# Patient Record
Sex: FEMALE | Race: WHITE | NOT HISPANIC OR LATINO | URBAN - METROPOLITAN AREA
[De-identification: names, ages, dates, MRNs, and addresses within clinical notes are randomized per-mention and may not be internally consistent; named-entity substitution may affect disease eponyms.]

---

## 2017-01-03 ENCOUNTER — AMBULATORY - HEALTHEAST (OUTPATIENT)
Dept: LAB | Facility: CLINIC | Age: 22
End: 2017-01-03

## 2017-01-03 DIAGNOSIS — R82.994 HYPERCALCIURIA: ICD-10-CM

## 2017-01-03 DIAGNOSIS — R82.992 HYPEROXALURIA: ICD-10-CM

## 2017-01-12 ENCOUNTER — OFFICE VISIT - HEALTHEAST (OUTPATIENT)
Dept: UROLOGY | Facility: CLINIC | Age: 22
End: 2017-01-12

## 2017-01-12 DIAGNOSIS — N20.9 URINARY CALCULUS, UNSPECIFIED: ICD-10-CM

## 2017-01-12 DIAGNOSIS — R82.994 HYPERCALCIURIA: ICD-10-CM

## 2017-03-03 ENCOUNTER — COMMUNICATION - HEALTHEAST (OUTPATIENT)
Dept: FAMILY MEDICINE | Facility: CLINIC | Age: 22
End: 2017-03-03

## 2018-04-24 ENCOUNTER — RECORDS - HEALTHEAST (OUTPATIENT)
Dept: ADMINISTRATIVE | Facility: OTHER | Age: 23
End: 2018-04-24

## 2018-09-14 ENCOUNTER — RECORDS - HEALTHEAST (OUTPATIENT)
Dept: ADMINISTRATIVE | Facility: OTHER | Age: 23
End: 2018-09-14

## 2019-05-09 ENCOUNTER — RECORDS - HEALTHEAST (OUTPATIENT)
Dept: ADMINISTRATIVE | Facility: OTHER | Age: 24
End: 2019-05-09

## 2019-06-26 ENCOUNTER — RECORDS - HEALTHEAST (OUTPATIENT)
Dept: ADMINISTRATIVE | Facility: OTHER | Age: 24
End: 2019-06-26

## 2019-07-15 ENCOUNTER — RECORDS - HEALTHEAST (OUTPATIENT)
Dept: ADMINISTRATIVE | Facility: OTHER | Age: 24
End: 2019-07-15

## 2019-09-08 ENCOUNTER — COMMUNICATION - HEALTHEAST (OUTPATIENT)
Dept: FAMILY MEDICINE | Facility: CLINIC | Age: 24
End: 2019-09-08

## 2019-10-02 ENCOUNTER — OFFICE VISIT - HEALTHEAST (OUTPATIENT)
Dept: FAMILY MEDICINE | Facility: CLINIC | Age: 24
End: 2019-10-02

## 2019-10-02 DIAGNOSIS — F90.0 ADHD (ATTENTION DEFICIT HYPERACTIVITY DISORDER), INATTENTIVE TYPE: ICD-10-CM

## 2019-10-02 DIAGNOSIS — F41.1 GENERALIZED ANXIETY DISORDER: ICD-10-CM

## 2019-10-02 DIAGNOSIS — Z00.00 ROUTINE GENERAL MEDICAL EXAMINATION AT A HEALTH CARE FACILITY: ICD-10-CM

## 2019-10-02 ASSESSMENT — PATIENT HEALTH QUESTIONNAIRE - PHQ9: SUM OF ALL RESPONSES TO PHQ QUESTIONS 1-9: 0

## 2019-10-02 ASSESSMENT — MIFFLIN-ST. JEOR: SCORE: 1249.13

## 2019-10-09 ENCOUNTER — OFFICE VISIT - HEALTHEAST (OUTPATIENT)
Dept: BEHAVIORAL HEALTH | Facility: CLINIC | Age: 24
End: 2019-10-09

## 2019-10-09 DIAGNOSIS — F90.0 ADHD (ATTENTION DEFICIT HYPERACTIVITY DISORDER), INATTENTIVE TYPE: ICD-10-CM

## 2019-10-09 DIAGNOSIS — F41.1 GENERALIZED ANXIETY DISORDER: ICD-10-CM

## 2019-10-09 LAB
AMPHETAMINES UR QL SCN: NORMAL
BARBITURATES UR QL: NORMAL
BENZODIAZ UR QL: NORMAL
CANNABINOIDS UR QL SCN: NORMAL
COCAINE UR QL: NORMAL
CREAT UR-MCNC: 176.3 MG/DL
METHADONE UR QL SCN: NORMAL
OPIATES UR QL SCN: NORMAL
OXYCODONE UR QL: NORMAL
PCP UR QL SCN: NORMAL

## 2019-10-09 ASSESSMENT — ANXIETY QUESTIONNAIRES
7. FEELING AFRAID AS IF SOMETHING AWFUL MIGHT HAPPEN: NOT AT ALL
1. FEELING NERVOUS, ANXIOUS, OR ON EDGE: SEVERAL DAYS
GAD7 TOTAL SCORE: 6
4. TROUBLE RELAXING: SEVERAL DAYS
3. WORRYING TOO MUCH ABOUT DIFFERENT THINGS: SEVERAL DAYS
5. BEING SO RESTLESS THAT IT IS HARD TO SIT STILL: SEVERAL DAYS
6. BECOMING EASILY ANNOYED OR IRRITABLE: SEVERAL DAYS
2. NOT BEING ABLE TO STOP OR CONTROL WORRYING: SEVERAL DAYS

## 2019-10-09 ASSESSMENT — MIFFLIN-ST. JEOR: SCORE: 1253.67

## 2019-10-09 ASSESSMENT — PATIENT HEALTH QUESTIONNAIRE - PHQ9: SUM OF ALL RESPONSES TO PHQ QUESTIONS 1-9: 2

## 2019-10-10 ENCOUNTER — COMMUNICATION - HEALTHEAST (OUTPATIENT)
Dept: BEHAVIORAL HEALTH | Facility: CLINIC | Age: 24
End: 2019-10-10

## 2019-10-14 ENCOUNTER — COMMUNICATION - HEALTHEAST (OUTPATIENT)
Dept: BEHAVIORAL HEALTH | Facility: CLINIC | Age: 24
End: 2019-10-14

## 2019-10-15 ENCOUNTER — COMMUNICATION - HEALTHEAST (OUTPATIENT)
Dept: BEHAVIORAL HEALTH | Facility: CLINIC | Age: 24
End: 2019-10-15

## 2019-10-23 ENCOUNTER — OFFICE VISIT - HEALTHEAST (OUTPATIENT)
Dept: BEHAVIORAL HEALTH | Facility: CLINIC | Age: 24
End: 2019-10-23

## 2019-10-23 DIAGNOSIS — F41.1 GENERALIZED ANXIETY DISORDER: ICD-10-CM

## 2019-10-23 ASSESSMENT — MIFFLIN-ST. JEOR: SCORE: 1253.67

## 2019-11-04 ENCOUNTER — COMMUNICATION - HEALTHEAST (OUTPATIENT)
Dept: BEHAVIORAL HEALTH | Facility: CLINIC | Age: 24
End: 2019-11-04

## 2019-11-04 DIAGNOSIS — F90.0 ADHD (ATTENTION DEFICIT HYPERACTIVITY DISORDER), INATTENTIVE TYPE: ICD-10-CM

## 2019-11-13 ENCOUNTER — COMMUNICATION - HEALTHEAST (OUTPATIENT)
Dept: BEHAVIORAL HEALTH | Facility: CLINIC | Age: 24
End: 2019-11-13

## 2019-11-13 DIAGNOSIS — F90.0 ADHD (ATTENTION DEFICIT HYPERACTIVITY DISORDER), INATTENTIVE TYPE: ICD-10-CM

## 2019-11-20 ENCOUNTER — OFFICE VISIT - HEALTHEAST (OUTPATIENT)
Dept: BEHAVIORAL HEALTH | Facility: CLINIC | Age: 24
End: 2019-11-20

## 2019-11-20 DIAGNOSIS — F41.1 GENERALIZED ANXIETY DISORDER: ICD-10-CM

## 2019-11-20 DIAGNOSIS — F90.0 ADHD (ATTENTION DEFICIT HYPERACTIVITY DISORDER), INATTENTIVE TYPE: ICD-10-CM

## 2019-11-20 RX ORDER — TRETINOIN 1 MG/G
CREAM TOPICAL
Status: SHIPPED | COMMUNITY
Start: 2018-01-09 | End: 2024-02-05

## 2019-11-20 ASSESSMENT — MIFFLIN-ST. JEOR: SCORE: 1244.6

## 2020-04-02 ENCOUNTER — COMMUNICATION - HEALTHEAST (OUTPATIENT)
Dept: BEHAVIORAL HEALTH | Facility: CLINIC | Age: 25
End: 2020-04-02

## 2020-04-02 DIAGNOSIS — F41.1 GENERALIZED ANXIETY DISORDER: ICD-10-CM

## 2020-04-07 ENCOUNTER — OFFICE VISIT - HEALTHEAST (OUTPATIENT)
Dept: FAMILY MEDICINE | Facility: CLINIC | Age: 25
End: 2020-04-07

## 2020-04-07 ENCOUNTER — COMMUNICATION - HEALTHEAST (OUTPATIENT)
Dept: BEHAVIORAL HEALTH | Facility: CLINIC | Age: 25
End: 2020-04-07

## 2020-04-07 DIAGNOSIS — F41.1 GENERALIZED ANXIETY DISORDER: ICD-10-CM

## 2020-04-07 ASSESSMENT — PATIENT HEALTH QUESTIONNAIRE - PHQ9: SUM OF ALL RESPONSES TO PHQ QUESTIONS 1-9: 3

## 2020-04-07 ASSESSMENT — ANXIETY QUESTIONNAIRES
7. FEELING AFRAID AS IF SOMETHING AWFUL MIGHT HAPPEN: NOT AT ALL
4. TROUBLE RELAXING: NOT AT ALL
IF YOU CHECKED OFF ANY PROBLEMS ON THIS QUESTIONNAIRE, HOW DIFFICULT HAVE THESE PROBLEMS MADE IT FOR YOU TO DO YOUR WORK, TAKE CARE OF THINGS AT HOME, OR GET ALONG WITH OTHER PEOPLE: SOMEWHAT DIFFICULT
3. WORRYING TOO MUCH ABOUT DIFFERENT THINGS: MORE THAN HALF THE DAYS
GAD7 TOTAL SCORE: 5
5. BEING SO RESTLESS THAT IT IS HARD TO SIT STILL: SEVERAL DAYS
2. NOT BEING ABLE TO STOP OR CONTROL WORRYING: SEVERAL DAYS
6. BECOMING EASILY ANNOYED OR IRRITABLE: SEVERAL DAYS
1. FEELING NERVOUS, ANXIOUS, OR ON EDGE: NOT AT ALL

## 2020-06-29 ENCOUNTER — TRANSFERRED RECORDS (OUTPATIENT)
Dept: HEALTH INFORMATION MANAGEMENT | Facility: CLINIC | Age: 25
End: 2020-06-29

## 2020-07-30 ENCOUNTER — VIRTUAL VISIT (OUTPATIENT)
Dept: FAMILY MEDICINE | Facility: OTHER | Age: 25
End: 2020-07-30
Payer: COMMERCIAL

## 2020-07-30 PROCEDURE — 99421 OL DIG E/M SVC 5-10 MIN: CPT | Performed by: PHYSICIAN ASSISTANT

## 2020-07-30 NOTE — PROGRESS NOTES
"Date: 2020 15:20:30  Clinician: Tomeka Corbett  Clinician NPI: [id number]  Patient: Malini Mcclain  Patient : 1995  Patient Address: [address], [place] [address]  Patient Phone: [phone]  Visit Protocol: URI  Patient Summary:  Malini is a 25 year old ( : 1995 ) female who initiated a Visit for COVID-19 (Coronavirus) evaluation and screening. When asked the question \"Please sign me up to receive news, health information and promotions from [employer].\", Malini responded \"No\".    When asked when her symptoms started, Malini reported that she does not have any symptoms.   She denies having recent facial or sinus surgery in the past 60 days and taking antibiotic medication in the past month.    Pertinent COVID-19 (Coronavirus) information  In the past 14 days, Malini has not worked in a congregate living setting.   She either works or volunteers as a healthcare worker or a , or works or volunteers in a healthcare facility. 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 She lives with a healthcare worker.   Malini has had a close contact with a laboratory-confirmed COVID-19 patient in the last 14 days. She was exposed at her work. Additional information about contact with COVID-19 (Coronavirus) patient as reported by the patient (free text): Patient underwent surgery  with pending COVID test. Positive result returned later that day. 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 Possible earlier exposure on  when I was on staff and another patient received a positive COVID test the day of surgery.   Pertinent medical history  Malini does not get yeast infections when she takes antibiotics.   Malini does " not need a return to work/school note.   Weight: 120 lbs   Malini does not smoke or use smokeless tobacco.   She denies pregnancy and denies breastfeeding. She does not menstruate.   Additional information as reported by the patient (free text): Mild sore throat that began today 7/30.   Weight: 120 lbs    MEDICATIONS: Kariva (28) oral, sertraline oral, adapalene topical, spironolactone oral, ALLERGIES: NKDA  Clinician Response:  Dear Malini,   Based on your exposure to COVID-19 (coronavirus), we would like to test you for this virus.  1. Please call [phone] to schedule your visit. Explain that you were referred by [facility] to have a COVID-19 test. Be ready to share your [facility] visit ID number.  The following will serve as your written order for this COVID Test, ordered by me, for the indication of suspected COVID [Z20.828]: The test will be ordered in [employer], our electronic health record, after you are scheduled. It will show as ordered and authorized by Edgardo Skaggs MD.  Order: COVID-19 (coronavirus) PCR for ASYMPTOMATIC EXPOSURE testing from [facility].  If you know you have had close contact with someone who tested positive, you should be quarantined for 14 days after this exposure. You should stay in quarantine for the14 days even if the covid test is negative, the optimal time to test after exposure is 5-7 days from the exposure  Quarantine means   What should I do?  For safety, it's very important to follow these rules. Do this for 14 days after the date you were last exposed to the virus..  Stay home and away from others. Don't go to school or anywhere else. Generally quarantine means staying home for work but there are some exceptions to this. Please contact your workplace.   No hugging, kissing or shaking hands.  Don't let anyone visit.  Cover your mouth and nose with a mask, tissue or washcloth to avoid spreading germs.  Wash your hands and face often. Use soap and water.  What are the symptoms of COVID-19?  The  most common symptoms are cough, fever and trouble breathing. Less common symptoms include headache, body aches, fatigue (feeling very tired), chills, sore throat, stuffy or runny nose, diarrhea (loose poop), loss of taste or smell, belly pain, and nausea or vomiting (feeling sick to your stomach or throwing up).  After 14 days, if you have still don't have symptoms, you likely don't have this virus.  If you develop symptoms, follow these guidelines.  If you're normally healthy: Please start another [facility] visit to report your symptoms. Go to [email or web address].  If you have a serious health problem (like cancer, heart failure, an organ transplant or kidney disease): Call your specialty clinic. Let them know that you might have COVID-19.  2. When it's time for your COVID test:  Stay at least 6 feet away from others. (If someone will drive you to your test, stay in the backseat, as far away from the  as you can.)  Cover your mouth and nose with a mask, tissue or washcloth.  Go straight to the testing site. Don't make any stops on the way there or back.  Please note  Caregivers in these groups are at risk for severe illness due to COVID-19:  o People 65 years and older  o People who live in a nursing home or long-term care facility  o People with chronic disease (lung, heart, cancer, diabetes, kidney, liver, immunologic)  o People who have a weakened immune system, including those who:  Are in cancer treatment  Take medicine that weakens the immune system, such as corticosteroids  Had a bone marrow or organ transplant  Have an immune deficiency  Have poorly controlled HIV or AIDS  Are obese (body mass index of 40 or higher)  Smoke regularly  Where can I get more information?   "[employer] [place] -- About COVID-19: [email or web address]  CDC -- What to Do If You're Sick: www.cdc.gov/coronavirus/2019-ncov/about/steps-when-sick.html  CDC -- Ending Home Isolation:  www.cdc.gov/coronavirus/2019-ncov/hcp/disposition-in-home-patients.html  [facility] -- Caring for Someone: www.cdc.gov/coronavirus/2019-ncov/if-you-are-sick/care-for-someone.html  [facility] -- Interim Guidance for Hospital Discharge to Home: www.[facility].[place]/diseases/coronavirus/hcp/hospdischarge.pdf  [facility] clinical trials (COVID-19 research studies): clinicalaffairs.[facility]-clinical-trials  Below are the COVID-19 hotlines at the [place] Department of Health ([facility]). Interpreters are available.  For health questions: Call 171-195-3143 or 1-563.334.2495 (7 a.m. to 7 p.m.)  For questions about schools and childcare: Call 030-495-7341 or 1-331.672.7325 (7 a.m. to 7 p.m.)    Diagnosis: Contact with and (suspected) exposure to other viral communicable diseases  Diagnosis ICD: Z20.828

## 2020-07-31 DIAGNOSIS — Z20.822 EXPOSURE TO COVID-19 VIRUS: Primary | ICD-10-CM

## 2020-08-02 DIAGNOSIS — Z20.822 COVID-19 RULED OUT: Primary | ICD-10-CM

## 2020-08-02 PROCEDURE — U0003 INFECTIOUS AGENT DETECTION BY NUCLEIC ACID (DNA OR RNA); SEVERE ACUTE RESPIRATORY SYNDROME CORONAVIRUS 2 (SARS-COV-2) (CORONAVIRUS DISEASE [COVID-19]), AMPLIFIED PROBE TECHNIQUE, MAKING USE OF HIGH THROUGHPUT TECHNOLOGIES AS DESCRIBED BY CMS-2020-01-R: HCPCS | Performed by: FAMILY MEDICINE

## 2020-08-03 LAB
SARS-COV-2 RNA SPEC QL NAA+PROBE: NOT DETECTED
SPECIMEN SOURCE: NORMAL

## 2020-09-08 ENCOUNTER — COMMUNICATION - HEALTHEAST (OUTPATIENT)
Dept: FAMILY MEDICINE | Facility: CLINIC | Age: 25
End: 2020-09-08

## 2020-09-08 DIAGNOSIS — F33.0 MILD RECURRENT MAJOR DEPRESSION (H): ICD-10-CM

## 2020-12-04 ENCOUNTER — TRANSFERRED RECORDS (OUTPATIENT)
Dept: HEALTH INFORMATION MANAGEMENT | Facility: CLINIC | Age: 25
End: 2020-12-04

## 2021-01-05 ENCOUNTER — COMMUNICATION - HEALTHEAST (OUTPATIENT)
Dept: FAMILY MEDICINE | Facility: CLINIC | Age: 26
End: 2021-01-05

## 2021-01-05 DIAGNOSIS — F33.0 MILD RECURRENT MAJOR DEPRESSION (H): ICD-10-CM

## 2021-01-18 ENCOUNTER — COMMUNICATION - HEALTHEAST (OUTPATIENT)
Dept: FAMILY MEDICINE | Facility: CLINIC | Age: 26
End: 2021-01-18

## 2021-01-18 DIAGNOSIS — H00.014 HORDEOLUM EXTERNUM OF LEFT UPPER EYELID: ICD-10-CM

## 2021-01-19 RX ORDER — ERYTHROMYCIN 5 MG/G
OINTMENT OPHTHALMIC
Qty: 3.5 G | Refills: 0 | Status: SHIPPED | OUTPATIENT
Start: 2021-01-19 | End: 2024-02-05

## 2021-01-27 ENCOUNTER — AMBULATORY - HEALTHEAST (OUTPATIENT)
Dept: NURSING | Facility: CLINIC | Age: 26
End: 2021-01-27

## 2021-02-17 ENCOUNTER — AMBULATORY - HEALTHEAST (OUTPATIENT)
Dept: NURSING | Facility: CLINIC | Age: 26
End: 2021-02-17

## 2021-03-31 ENCOUNTER — TRANSFERRED RECORDS (OUTPATIENT)
Dept: HEALTH INFORMATION MANAGEMENT | Facility: CLINIC | Age: 26
End: 2021-03-31

## 2021-04-13 ENCOUNTER — COMMUNICATION - HEALTHEAST (OUTPATIENT)
Dept: FAMILY MEDICINE | Facility: CLINIC | Age: 26
End: 2021-04-13

## 2021-04-13 DIAGNOSIS — F33.0 MILD RECURRENT MAJOR DEPRESSION (H): ICD-10-CM

## 2021-04-19 ENCOUNTER — OFFICE VISIT - HEALTHEAST (OUTPATIENT)
Dept: FAMILY MEDICINE | Facility: CLINIC | Age: 26
End: 2021-04-19

## 2021-04-19 DIAGNOSIS — F41.1 GENERALIZED ANXIETY DISORDER: ICD-10-CM

## 2021-04-19 RX ORDER — SPIRONOLACTONE 100 MG/1
100 TABLET, FILM COATED ORAL DAILY
Status: SHIPPED | COMMUNITY
Start: 2021-04-06 | End: 2024-02-05

## 2021-04-19 RX ORDER — FLUCONAZOLE 150 MG/1
TABLET ORAL PRN
Status: SHIPPED | COMMUNITY
Start: 2021-04-07 | End: 2024-02-05

## 2021-04-19 ASSESSMENT — ANXIETY QUESTIONNAIRES
6. BECOMING EASILY ANNOYED OR IRRITABLE: NOT AT ALL
5. BEING SO RESTLESS THAT IT IS HARD TO SIT STILL: NOT AT ALL
7. FEELING AFRAID AS IF SOMETHING AWFUL MIGHT HAPPEN: NOT AT ALL
3. WORRYING TOO MUCH ABOUT DIFFERENT THINGS: NOT AT ALL
GAD7 TOTAL SCORE: 0
2. NOT BEING ABLE TO STOP OR CONTROL WORRYING: NOT AT ALL
1. FEELING NERVOUS, ANXIOUS, OR ON EDGE: NOT AT ALL
4. TROUBLE RELAXING: NOT AT ALL

## 2021-04-19 ASSESSMENT — PATIENT HEALTH QUESTIONNAIRE - PHQ9: SUM OF ALL RESPONSES TO PHQ QUESTIONS 1-9: 1

## 2021-04-29 ENCOUNTER — COMMUNICATION - HEALTHEAST (OUTPATIENT)
Dept: FAMILY MEDICINE | Facility: CLINIC | Age: 26
End: 2021-04-29

## 2021-05-19 ENCOUNTER — COMMUNICATION - HEALTHEAST (OUTPATIENT)
Dept: FAMILY MEDICINE | Facility: CLINIC | Age: 26
End: 2021-05-19

## 2021-05-19 DIAGNOSIS — F41.1 GENERALIZED ANXIETY DISORDER: ICD-10-CM

## 2021-05-26 ENCOUNTER — RECORDS - HEALTHEAST (OUTPATIENT)
Dept: ADMINISTRATIVE | Facility: CLINIC | Age: 26
End: 2021-05-26

## 2021-05-26 ENCOUNTER — COMMUNICATION - HEALTHEAST (OUTPATIENT)
Dept: FAMILY MEDICINE | Facility: CLINIC | Age: 26
End: 2021-05-26

## 2021-05-26 DIAGNOSIS — F41.1 GENERALIZED ANXIETY DISORDER: ICD-10-CM

## 2021-05-26 ASSESSMENT — PATIENT HEALTH QUESTIONNAIRE - PHQ9
SUM OF ALL RESPONSES TO PHQ QUESTIONS 1-9: 2
SUM OF ALL RESPONSES TO PHQ QUESTIONS 1-9: 0

## 2021-05-27 ASSESSMENT — PATIENT HEALTH QUESTIONNAIRE - PHQ9
SUM OF ALL RESPONSES TO PHQ QUESTIONS 1-9: 1
SUM OF ALL RESPONSES TO PHQ QUESTIONS 1-9: 3

## 2021-05-28 ASSESSMENT — ANXIETY QUESTIONNAIRES
GAD7 TOTAL SCORE: 5
GAD7 TOTAL SCORE: 6
GAD7 TOTAL SCORE: 0

## 2021-06-01 NOTE — PROGRESS NOTES
FEMALE ADULT PREVENTIVE EXAM    CHIEF COMPLAINT:  Female preventive exam.    SUBJECTIVE:  Malini Mcclain is a 24 y.o. female who presents for her routine physical exam.    Patient would like to address the following concerns today:   1) She was seeing a psychiatrist in [facility].  Also seeing therapist on regular basis.  Has been taking sertraline 100 mg daily.  No side effects from medicine.  Also was having some depression after she initially moved out eas  2) ADHD - She had more issues with attention/ concentration after college in workplace.  Started on Adderall XR 10 mg- taking 1-2 daily. Helped with anxiety.  No side effects. At one point switched to Concerta and had side effects on that medicine.  3) Had lot of vaginitis while she was in [facility].  She is seeing gyn here.     GYNE HISTORY  Menses: Taking OCPs continuously so no bleeding  Sexually Active: no  Contraception: OCPs  Last Pap: 2019 normal  Abnormal Pap: no        She  has a past medical history of Anxiety, GERD (gastroesophageal reflux disease), and Kidney stone.    Lab Results   Component Value Date    WBC 7.3 08/07/2016    HGB 14.0 08/07/2016    HCT 41.2 08/07/2016    MCV 89 08/07/2016     08/07/2016     08/18/2016    K 4.2 08/18/2016    BUN 11 08/18/2016     Lab Results   Component Value Date    CHOL 143 05/23/2014    HDL 55 05/23/2014    LDLCALC 82 05/23/2014    TRIG 29 05/23/2014     Lab Results   Component Value Date    TSH 2.0 06/06/2012     BP Readings from Last 3 Encounters:   10/02/19 112/82   01/12/17 113/64   11/22/16 115/58       Surgeries:    Past Surgical History:   Procedure Laterality Date     NO PAST SURGERIES         Family History:  Her family history includes Alcohol abuse in her maternal uncle; Breast cancer in her maternal grandmother; Cancer in her maternal grandfather and maternal grandmother; Diabetes in her paternal grandfather; Hearing loss in her mother; Heart disease in her paternal  grandfather; Hypertension in her paternal grandfather; Urolithiasis in her maternal grandfather; Vision loss in her paternal grandmother.    Social History:  She  reports that she has never smoked. She has never used smokeless tobacco. She reports current alcohol use. She reports that she does not use drugs. Single.  Living with parents.  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    Medications:    Current Outpatient Medications:      dextroamphetamine-amphetamine (ADDERALL XR) 10 MG 24 hr capsule, Take 10 mg by mouth daily.    , Disp: , Rfl:      KARIVA, 28, 0.15-0.02 mgx21 /0.01 mg x 5 per tablet, TAKE 1 TABLET BY MOUTH ONCE DAILY, Disp: , Rfl: 2     sertraline (ZOLOFT) 50 MG tablet, TAKE 1&1/2 TABS BY MOUTH DAILY, Disp: 135 tablet, Rfl: 1     tazarotene (AVAGE) 0.1 % cream, Apply topically bedtime., Disp: , Rfl:   HELD MEDICATIONS: None.    Allergies:  No latex allergies.  No Known Allergies         RISK BEHAVIOR & HEALTH HABITS  Regular Exercise: works out at gym  Balanced diet: yes.  Seat Belt Use: yes  Calcium/Vitamin D: no.  Stress management: walking    Dental Care: YES    REVIEW OF SYSTEMS:  Complete head to toe review of systems is otherwise negative except as above.    OBJECTIVE:  VITAL SIGNS:    Vitals:    10/02/19 1135   BP: 112/82   Pulse: 67   SpO2: 100%     GENERAL:  Patient alert, in no acute distress.  EYES: PERRLA. Extraoccular movements intact, pupils equal, reactive to light and accommodation.  Normal conjunctiva and lids.    ENT:  Hearing grossly normal.  Normal appearance to ears and nose.  Bilateral TM s, external canals, oropharynx normal. Normal lips, gums and teeth.    NECK:  Supple, without thyromegaly or mass.  RESP:  Clear to auscultation without crackles, wheezes or distress.  Normal respiratory effort.   CV:  Regular rate and rhythm without murmurs, rubs or gallops.  Normal pedal pulses.  No varicosities or edema.  ABDOMEN:  Soft, non-tender, without hepatosplenomegaly, masses,  or hernias.   LYMPHATIC: Normal palpation of neck.  No lymphadenopathy.  No bruising.  NEURO:  CN II-XII intact, motor & sensory function all intact.  DTR and reflexes normal.  PSYCHIATRIC:  Alert & oriented with normal mood and affect.  Good judgment and insight.  SKIN:  Normal inspection and palpation.  MUSCULOSKELETAL: Normal gait and station.  - Spine / Ribs / Pelvis: Normal inspection, ROM, stability and strength: Spine, Head, Neck, Upper and Lower Extremities.    Malini was seen today for annual exam.    Diagnoses and all orders for this visit:    Routine general medical examination at a health care facility  Up to date with pap/ tetanus.    Generalized anxiety disorder- Controlled.  -     Ambulatory referral to Psychiatry  -     sertraline (ZOLOFT) 100 MG tablet; Take 1 tablet (100 mg total) by mouth daily.    ADHD (attention deficit hyperactivity disorder), inattentive type- Controlled.  She signed controlled substance contract.  Will need to f/u in 6 months with me if she has not gotten into psychiatry.  -     Ambulatory referral to Psychiatry  -     dextroamphetamine-amphetamine (ADDERALL XR) 10 MG 24 hr capsule; Take 1-2 po in am    Other orders  -     Influenza, Seasonal Quad, PF =/> 6months           Rachael Em

## 2021-06-02 NOTE — TELEPHONE ENCOUNTER
Patient is still not taking Strattera, she is now aware provider was out tues and wed, wondering if her follow up for 10.23. should be pushed back since she hasnt been taking the med? Please advise ok to leave message and this writer can follow up on the reschedule portion if needed .

## 2021-06-02 NOTE — TELEPHONE ENCOUNTER
Records received from [facility] per request from provider.    Labeled and placed in provider's mail box

## 2021-06-02 NOTE — PATIENT INSTRUCTIONS - HE
Continue medications as prescribed  Have your pharmacy contact us for a refill if you are running low on medications (We may ask you to come into clinic to get a refill from the nurse  No Alcohol or drug use  No driving if sedated  Call the clinic with any questions or concerns   Reach out for help if you feel like hurting yourself or others ([facility] [phone]: [address] [facility] [facility], [address] or [facility] Suicide Hotline [phone] , call 911 or go to nearest Emergency room    Follow up as directed, for your appointments, per your After Visit Summary Form.

## 2021-06-02 NOTE — TELEPHONE ENCOUNTER
Patient calling back, she does want to continue with Amanda , she just wants directive on the particular med issue and know what she should do with the side effects etc if she should continue . Advised nurses were in meeting until 330

## 2021-06-02 NOTE — TELEPHONE ENCOUNTER
Patient called and she said she had no intention of not continuing with SHEMAR Perez.  She said there must have been some miscommunication.  She has not taken any Strattera since Monday.  Her next appointment is next Wednesday 10-23-19.  She is looking for a directive if she should start taking it again until seen or not.

## 2021-06-02 NOTE — TELEPHONE ENCOUNTER
Spoke to patient and she said this morning was her first dose of Strattera.  She is feeling tired, sad and having a hard time with concentration.  She states she feels safe and will call us tomorrow with an update before going to class. She will  Not take the Strattera in the morning until SHEMAR Perez gives a directive.  Patient is wondering if these symptoms are from the Strattera.

## 2021-06-02 NOTE — PROGRESS NOTES
Correct pharmacy verified with patient and confirmed in snapshot? [x] yes []no    Charge captured ? [x] yes  [] no    Medications Phoned  to Pharmacy [] yes [x]no  Name of Pharmacist:  List Medications, including dose, quantity and instructions      Medication Prescriptions given to patient   [] yes  [x] no   List the name of the drug the prescription was written for.       Medications ordered this visit were e-scribed.  Verified by order class [x] yes  [] no  Strattera 40 mg   Medication changes or discontinuations were communicated to patient's pharmacy: [] yes  [x] no    UA collected [x] yes  [] no    [place] Prescription Monitoring Program Reviewed? [x] yes  [] no    Referrals were made to: None     Future appointment was made: [x] yes  [] no  10/23/2019  Dictation completed at time of chart check: [x] yes  [] no    I have checked the documentation for today s encounters and the above information has been reviewed and completed.

## 2021-06-02 NOTE — PROGRESS NOTES
"      Date of Service:  10/9/2019    Name:  Malini Mcclain  :  1995  MRN:  [id number]    HPI:   Malini Mcclain is a 24 y.o. female referred by her primary care provider to establish care for psychiatric medication management..    Collateral information from Dr. Em notes 10-19-   \"  She was seeing a psychiatrist in [facility].  Also seeing therapist on regular basis.  Has been taking sertraline 100 mg daily.  No side effects from medicine.  Also [place]  2) ADHD - She had more issues with attention/ concentration after college in workplace.  Started on Adderall XR 10 mg- taking 1-2 daily. Helped with anxiety.  No side effects. At one point switched to Concerta and had side effects on that medicine\"     Per patient statement-  Past medication  Xanax -  While in college   Concerta     Current medication  1-dextroamphetamine-amphetamine (ADDERALL XR) 10 MG 24 hr capsule, Take 10 mg by mouth daily-  Started by  10/2/19  2. sertraline (ZOLOFT) 50 MG tablet, TAKE 1&1/2 TABS BY MOUTH DAILY- Matilda - 10/2/19       Started taking  Sertraline 50 mg   in High school for depression and anxiety.  Graduated  From college in -was not on any ADD medication at this time had no diagnosis of ADD at this time.    Saw a psychiatrist at [facility] .  Was following with psychiatrist after college.  Last visit spring 2019- med prescribed at .  At this time med's changes as follows  Sertraline was increased to 100 mg she was also started on Adderall XR 10-20 mg daily -states that she used about 20 mg daily for over 6 months.  She moved to [place] in spring.  She ran out of her Adderall and was out for several months until recently 10-19 when she visited her primary care provider for physical and Adderall was re-ordered.  She has been taking Adderall for approximately a week now.  Current psychiatric symptoms per patient " statement-  Anxiety : Well managed   Depression : Denies   Sleep : at least 8 hours   Reports inattentiveness that is mild to moderate.  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  Reports that she struggles with mild inattentiveness and focus during lectures.  Denies all other psychiatric symptoms.  Does not appear to be in any apparent distress when any imminent danger to self or others.  Verbally contracts for safety.  Given that ADD symptoms presented did not also include.  Also that she was able to go through high school and graduated from a bachelors program.  I would recommend trying a non-stimulant option to address ADD symptoms.  At this point I do not have a clear indication of a diagnosis of ADD.  She did sign a release of information for previous psychiatrist.  However we discussed in details medications benefits and side effects profile in details.  We also discussed current symptoms and medication management.  We discussed his stimulants and treatment of ADD including the risk of dependency tolerance and withdrawal.  We also discussed medications and side effects profile of Strattera.  We also discussed that the Strattera is a better option since there is minimum to no risk for tolerance dependency or withdrawal.  My recommendation at this time is to wean her off stimulants and put on a trial of Strattera.  We will continue the sertraline 100 mg to address depression.  Among the issues discussed today was how symptoms of anxiety depression and ADD can overlap and sometimes and I do not hold depression and anxiety symptoms may present as ADD symptoms and vice versa.  We discussed that treating anxiety and depression symptoms in the case of mild inattentiveness may resolve her current symptoms and therefore my current focus would be to focus on this.  She endorsed understanding and is  agreeable to this plan.  She will wean  herself off the Adderall.  She reports that she was able to stop it without weaning off several months ago when she did not have a prescription refill.  However still I still recommend that she weans herself off.  She will start Strattera today at 40 mg daily x3 days and increase dose to 40 mg twice daily.  I will have her return to the clinic in 2 weeks for follow-up appointment.  I also highly recommend psychotherapy and will discuss this in details during her next appointment.      Psychiatric History:  Current psychiatrist: Followed by psychiatrist in [facility] over 6 months ago.  Recently moved back to [place] come back home  Current psychotherapist: Had a therapist in [facility] where she was going to school  Current :none   Diagnoses: history of depression, anxiety,ADD??  Hospitalizations: Denies   Suicide attempts: Ever      Electroconvulsive therapy: Denies   Judicial commitments: Denies   Anxiety General   Social anxiety:   OCD: Denies   Depression: Denies   Teresa/hypomania: Denies   PTSD: Denies   Hallucinations :   Eating disorder:Denies   seizures    ADHD: Reported as a child as an adult    Personality disorder including history of oppositional defiant disorder or conduct disorder in childhood:      Decision Making Capacity   Patient has the capacity to make independent decisions regarding medical and psychiatric care.    Chemical use History:              2-3 drink drinks - 1-2 days a week   Marijuana use : Denies   Cigarette : Denies  Denies use of licit drugs           Past Medical History:          Past Medical History:   Diagnosis Date     Anxiety      GERD (gastroesophageal reflux disease)      Kidney stone        Past Surgical History:   Procedure Laterality Date     NO PAST SURGERIES          Family Psychiatric History:      Mental illness:  Dad- anxiety ,  Addiction:  Denies   Suicide:Denies       Social History:       Marital Status  ": Single   Number of children: Denies.  Current living circumstances: Liives with parents .  Current sources of financial support: Parents and savings     Obstetric History:  Last menstrual period:  Last period ~ 5 years ago   Birth control ; Birth Control Pills       History:  Denied  service.    Access to weapons  Denies access to weapons.           Trauma & Abuse History:  Major accidents and injuries: Denies   Concussion or traumatic brain injury: Denies .  Abuse:  Denies     Spiritual History:  Sources of hope, meaning, comfort, strength, peace and love: \" Family \"   Part of an organized Alevism:  \" Nondenominational , Zoroastrianism \"     Birth & Development History:  City and state of birth: [place]   Highest education achieved: Bachelors degree     Legal History:  Denies       [place] Prescription Monitoring Program:  No worrisome pharmacy activity.  Not indicated for this patient.    Medications:   These were reviewed.    Current Outpatient Medications on File Prior to Visit   Medication Sig Dispense Refill     dextroamphetamine-amphetamine (ADDERALL XR) 10 MG 24 hr capsule Take 1-2 po in am 30 capsule 0     KARIVA, 28, 0.15-0.02 mgx21 /0.01 mg x 5 per tablet TAKE 1 TABLET BY MOUTH ONCE DAILY  2     sertraline (ZOLOFT) 100 MG tablet Take 1 tablet (100 mg total) by mouth daily. 90 tablet 1     No current facility-administered medications on file prior to visit.        Lab Results:   Personally reviewed and discussed with the patient    Lab Results   Component Value Date    WBC 7.3 08/07/2016    HGB 14.0 08/07/2016    HCT 41.2 08/07/2016     08/07/2016    CHOL 143 05/23/2014    TRIG 29 05/23/2014    HDL 55 05/23/2014    ALT 14 05/23/2014    AST 19 05/23/2014     08/18/2016    K 4.2 08/18/2016     08/18/2016    CREATININE 0.69 08/18/2016    CREATININE 0.67 08/18/2016    BUN 11 08/18/2016    CO2 27 08/18/2016    TSH 2.0 06/06/2012     No results found for: PHENYTOIN, PHENOBARB, VALPROATE, " "NEHA      Vital signs:  Vitals:    10/09/19 0854   BP: 141/84   Patient Site: Right Arm   Patient Position: Sitting   Cuff Size: Adult Regular   Pulse: 75   Weight: 119 lb (54 kg)   Height: 5' 3.3\" (1.608 m)     Allergies:   Patient has no known allergies.      No Known Allergies  Associated Clinical Documents:       Notes reviewed in EPIC and [facility] including: medication reconciliation, progress notes, recent labs, PMH, and OSH records.    ROS:       10 point ROS was negative except for the items listed in HPI.  No Medication s/e's  Review of Systems - General ROS: negative for - chills, fatigue, night sweats, sleep disturbance or weight loss  Respiratory ROS: no cough, shortness of breath, or wheezing  negative for - cough or shortness of breath  Cardiovascular ROS: no chest pain or dyspnea on exertion  Gastrointestinal ROS: no abdominal pain, change in bowel habits, or black or bloody stools  Musculoskeletal ROS: negative  negative for - gait disturbance, joint pain, joint stiffness, muscle pain or muscular weakness  Neurological ROS: negative for - confusion, gait disturbance, headaches or seizures        MSE:      Alert & oriented x 3.   Appearance: Appears stated age, casually dressed.  Speech: Normal rate, rhythm and tone.  Gait: Normal.  Musculoskeletal: Normal strength, no abnormal movements.  Mood/Affect: Neutral.  Thought Process: Normal rate, logical.  Thought Content: No suicide or homicide ideation.  Associations: Intact, no delusions.  Perceptions: No hallucinations.  Memory: recent and remote memory intact.  Attention span and concentration: normal.  Language: Intact.  Fund of Knowledge: Normal.  Insight and Judgement: Adequate.    Impression:           Patient reports history of ADHD  Reported history of anxiety      Plan:         Patient and I reviewed diagnosis and treatment plan.   Reviewed risks/benefits of medication with patient.  Ongoing education given regarding diagnostic and treatment options " with adequate verbalization of understanding  Patient agrees with following recommendations:    1. DAM - Baseline   2. Ambulatory ref feral to psychology  -  4. Wean off Adderall.  Start Strattera 40 mg daily for 3 days and increase dose to 40 mg twice daily taking 1 dose early in the morning and that second dose early in the evening.  5.  Return to the clinic in 2 weeks for follow-up appointment call in between visits any questions or concerns  6.  Highly recommend  psychotherapy.  Also recommend neuropsychiatric evaluation.    Total Time:      60 Minutes spent on this visit with >50% time spent on  discussing and educating patient about diagnosis, treatment options, risks, benefits ,side effects of medications and instructions for follow up.  Time also spent on reviewing  Past  EHR from the providers  For better transition of care    This dictation was completed with speech recognition software and there may be unintended word substitutions.

## 2021-06-02 NOTE — PROGRESS NOTES
Psychiatric  Progress Note  Date of visit:10/23/2019           Discussion of Care and Treatment Recommendations:   This is a 24 y.o. female with a history of  anxiety and self-reported history of ADD in adulthood.  Patient presents to the clinic today for follow-up appointment.      Last visit 10/09/19  Recommendation at last visit .  1. DAM - Baseline   2. Ambulatory ref feral to psychology  -  4. Wean off Adderall.  Start Strattera 40 mg daily for 3 days and increase dose to 40 mg twice daily taking 1 dose early in the morning and that second dose early in the evening.  5.  Return to the clinic in 2 weeks for follow-up appointment call in between visits any questions or concerns  6.  Highly recommend  psychotherapy.  Also recommend neuropsychiatric evaluation.  Patient and I reviewed diagnosis and treatment plan and patient agrees with following recommendations:  Ongoing education given regarding diagnostic and treatment options with adequate verbalization of understanding.  Plan     1. Highly recommend psychotherapy -  referal to psychology  made during initial visit-  2. Restart  Strattera 40 mg daily for 3 days and increase dose to 40 mg twice daily taking 1 dose early in the morning and that second dose early in the evening.Continue loft 100 mg daily   5.  Return to the clinic in 4 weeks for follow-up appointment call in between visits any questions or concerns  6.  Highly recommend  psychotherapy.  Also recommend neuropsychiatric evaluation.         Diagnoses:   BILLIE  Self reported hx of DD - in adulthood    Patient Active Problem List   Diagnosis     Esophageal Reflux     Diarrhea     Generalized Anxiety Disorder     Myalgia And Myositis     Viral Infection     Acute Sore Throat     Acute Tonsillitis     Acne     Scoliosis     Menorrhagia     Bronchospasm     Bronchitis     Mild recurrent major depression (H)     Ureterolithiasis     Urinary calculus, unspecified     Hyperoxaluria     High urine sodium      "Hypercalciuria     ADHD (attention deficit hyperactivity disorder), inattentive type             Chief Complaint / Subjective:    Chief complaint: \" I stooped taking Strattera\"     History of Present Illness:   Per patient statement-she tried 1 dose of Strattera and reported that she feels depressed and was not sure with it was a Strattera or she was just having a bad day.  We discussed that Strattera takes about 4 weeks to start showing positive effects and the most likely side effects that would have expected was potentially GI side effects headache etc. we discussed that should give the medication some time to work approximately 4 to 6 weeks.  Currently she reports some anxiety surrounding work deadlines.  We also again discussed that when anxiety and depression are under treated lack of initiative and focus can present.  We discussed that if we treat the symptoms successfully she may be able to focus better.  However I would recommend that she restart Strattera again and gives a time to work before displacing it.  She is agreeable to this plan.  She will therefore continue with Zoloft 100 mg and Strattera as prescribed.  She will return to the clinic in 4 weeks and call in between visits with any questions or concerns.    Mental Status Examination:   General: Adequate hygiene, cooperative  Speech: Normal in rate and tone  Language: Intact  Thought process: Coherent  Thought content:                           Auditory hallucinations-Denies                          Visual Hallucinations - Denies                         Delusions Absent                           Loose Associations:  No                          Suicidal thoughts: Denies                          Homicidal thoughts:Denies                        Affect: Neutral  Mood: Neutral   Intellectual functioning: Within normal limits  Memory: Within normal limits  Fund of knowledge: Average  Attention and concentration: Within normal limits  Gait: " Steady  Psychomotor activity: Calm, no agitation  Muscles: No atrophy, no abnormal movements  InSight and judgment: Fair      Drug/treatment history and current pattern of use:   Denies     Medication changes: See Above   Medication adherence: compliant  Medication side effects: absent  Information about medications: Side effects, benefits and alternative treatments discussed and patient agrees .    Psychotherapy: Supportive therapy day-to-day living    Education: Diet, exercise, abstinence from drugs and alcohol, patient will not drive if sedated and medications or  under influence of any substance    Lab Results:  Personally reviewed and discussed with the patient    Lab Results   Component Value Date    WBC 7.3 08/07/2016    HGB 14.0 08/07/2016    HCT 41.2 08/07/2016     08/07/2016    CHOL 143 05/23/2014    TRIG 29 05/23/2014    HDL 55 05/23/2014    ALT 14 05/23/2014    AST 19 05/23/2014     08/18/2016    K 4.2 08/18/2016     08/18/2016    CREATININE 0.69 08/18/2016    CREATININE 0.67 08/18/2016    BUN 11 08/18/2016    CO2 27 08/18/2016    TSH 2.0 06/06/2012       Vital signs:  There were no vitals taken for this visit.    Allergies: Patient has no known allergies.         Medications:       Current Outpatient Medications on File Prior to Visit   Medication Sig Dispense Refill     atomoxetine (STRATTERA) 40 MG capsule Take 40 mg daily for 3 days then 40 mg twice daily thereafter 60 capsule 0     KARIVA, 28, 0.15-0.02 mgx21 /0.01 mg x 5 per tablet TAKE 1 TABLET BY MOUTH ONCE DAILY  2     sertraline (ZOLOFT) 100 MG tablet Take 1 tablet (100 mg total) by mouth daily. 90 tablet 1     No current facility-administered medications on file prior to visit.                 Review of Systems:      ROS:       10 point ROS was negative except for the items listed in HPI.    Review of Systems - General ROS: negative for - chills, fatigue, night sweats, sleep disturbance or weight loss    Respiratory ROS: no  cough, shortness of breath, or wheezing  negative for - cough or shortness of breath  Cardiovascular ROS: no chest pain or dyspnea on exertion  Gastrointestinal ROS: no abdominal pain, change in bowel habits, or black or bloody stools  Musculoskeletal ROS: negative  negative for - gait disturbance, joint pain, joint stiffness, muscle pain or muscular weakness  Neurological ROS: negative for - confusion, gait disturbance, headaches or seizures    Coordination of Care:   More than 25 minutes spent on this visit  with more than 50% of time spent on coordination of care including: Educating patient about diagnosis, prognosis, side effects and benefits of medications, diet, exercise.  Time also spent providing supportive therapy regarding above issues.    This note was created using a dictation system. All typing errors or contextual distortion is unintentional and software inherent.

## 2021-06-02 NOTE — TELEPHONE ENCOUNTER
Patient called back this morning and said she is feeling much better without taking the Strattera. She is wondering if she should stop taking it all together as she only took her first dose yesterday.

## 2021-06-02 NOTE — PATIENT INSTRUCTIONS - HE
Continue medications as prescribed  Start taking Strattera  as follows : 40 mg/day for 3 days then increase dose to 40 mg  twice daily - (early morning and early evening)   Continue Sertraline 100 mg daily   Wean off adder al l  - has only been taking med consistently for 4 days   Have your pharmacy contact us for a refill if you are running low on medications (We may ask you to come into clinic to get a refill from the nurse  No Alcohol or drug use  No driving if sedated  Call the clinic with any questions or concerns   Reach out for help if you feel like hurting yourself or others ([facility] [phone]: [address] [facility], [address] or [facility] Suicide Hotline [phone] , call 911 or go to nearest Emergency room    Follow up as directed, for your appointments, per your After Visit Summary Form.

## 2021-06-02 NOTE — TELEPHONE ENCOUNTER
Pt called stating she is having some side effects of a new medication, Strattera. Pt is having dizziness, fatigue, and an increase in depression. Pt is looking for direction and requesting a call back regarding these side effects.

## 2021-06-02 NOTE — PROGRESS NOTES
Correct pharmacy verified with patient and confirmed in snapshot? [x] yes []no    Charge captured ? [x] yes  [] no    Medications Phoned  to Pharmacy [] yes [x]no  Name of Pharmacist:  List Medications, including dose, quantity and instructions      Medication Prescriptions given to patient   [] yes  [x] no   List the name of the drug the prescription was written for.       Medications ordered this visit were e-scribed.  Verified by order class [] yes  [x] no    Medication changes or discontinuations were communicated to patient's pharmacy: [] yes  [x] no    UA collected [] yes  [x] no    [place] Prescription Monitoring Program Reviewed? [x] yes  [] no    Referrals were made to:  NA    Future appointment was made: [x] yes  [] no  11/20/2019  Dictation completed at time of chart check: [x] yes  [] no    I have checked the documentation for today s encounters and the above information has been reviewed and completed.

## 2021-06-03 VITALS
HEART RATE: 67 BPM | OXYGEN SATURATION: 100 % | SYSTOLIC BLOOD PRESSURE: 112 MMHG | DIASTOLIC BLOOD PRESSURE: 82 MMHG | HEIGHT: 63 IN | BODY MASS INDEX: 20.91 KG/M2 | WEIGHT: 118 LBS

## 2021-06-03 VITALS
BODY MASS INDEX: 21.09 KG/M2 | DIASTOLIC BLOOD PRESSURE: 84 MMHG | HEART RATE: 75 BPM | SYSTOLIC BLOOD PRESSURE: 141 MMHG | HEIGHT: 63 IN | WEIGHT: 119 LBS

## 2021-06-03 VITALS
HEART RATE: 60 BPM | WEIGHT: 119 LBS | BODY MASS INDEX: 21.09 KG/M2 | HEIGHT: 63 IN | DIASTOLIC BLOOD PRESSURE: 77 MMHG | SYSTOLIC BLOOD PRESSURE: 129 MMHG

## 2021-06-03 NOTE — PATIENT INSTRUCTIONS - HE
Continue medications as prescribed  Have your pharmacy contact us for a refill if you are running low on medications (We may ask you to come into clinic to get a refill from the nurse  No Alcohol or drug use  No driving if sedated  Call the clinic with any questions or concerns   Reach out for help if you feel like hurting yourself or others ([facility] [phone]: [address] [facility], [address] or [facility] Suicide Hotline [phone] , call 911 or go to nearest Emergency room    Follow up as directed, for your appointments, per your After Visit Summary Form.

## 2021-06-03 NOTE — PROGRESS NOTES
Correct pharmacy verified with patient and confirmed in snapshot? [x] yes []no    Charge captured ? [x] yes  [] no    Medications Phoned  to Pharmacy [] yes [x]no  Name of Pharmacist:  List Medications, including dose, quantity and instructions      Medication Prescriptions given to patient   [] yes  [x] no   List the name of the drug the prescription was written for.       Medications ordered this visit were e-scribed.  Verified by order class [x] yes  [] no   Strattera 40 mg; Zoloft 100 mg  Medication changes or discontinuations were communicated to patient's pharmacy: [] yes  [x] no    UA collected [] yes  [x] no    [place] Prescription Monitoring Program Reviewed? [x] yes  [] no    Referrals were made to:  None    Future appointment was made: [x] yes  [] no  01/15/2020  Dictation completed at time of chart check: [x] yes  [] no    I have checked the documentation for today s encounters and the above information has been reviewed and completed.

## 2021-06-03 NOTE — PROGRESS NOTES
Psychiatric  Progress Note  Date of visit:11/20/2019         Discussion of Care and Treatment Recommendations:   This is a 24 y.o. female with  a history of  anxiety and self-reported history of ADD in adulthood.  Patient presents to the clinic today for follow-up appointment.         Last visit 10/23/19  Recommendation at last visit   1. Highly recommend psychotherapy -  referral to psychology  made during initial visit-  2. Restart  Strattera 40 mg daily for 3 days and increase dose to 40 mg twice daily taking 1 dose early in the morning and that second dose early in the evening.Continue Zoloft 100 mg daily   5.  Return to the clinic in 4 weeks for follow-up appointment call in between visits any questions or concerns  6.  Highly recommend  psychotherapy.  Also recommend neuropsychiatric evaluation  Patient and I reviewed diagnosis and treatment plan and patient agrees with following recommendations:  Ongoing education given regarding diagnostic and treatment options with adequate verbalization of understanding.  Plan   1. Highly recommend psychotherapy -  referral to psychology  made during initial visit-is yet to  appointment but intends to   2. .Continue Zoloft 100 mg daily , Strattera 40 mg two times a day   5.  Return to the clinic in 4 weeks for follow-up appointment call in between visits any questions or concerns  6.  Highly recommend  psychotherapy.  Also recommend neuropsychiatric evaluation         Diagnoses:     BILLIE  Self reported hx of ADD - in adulthood    Patient Active Problem List   Diagnosis     Esophageal Reflux     Diarrhea     Generalized Anxiety Disorder     Myalgia And Myositis     Viral Infection     Acute Sore Throat     Acute Tonsillitis     Acne     Scoliosis     Menorrhagia     Bronchospasm     Bronchitis     Mild recurrent major depression (H)     Ureterolithiasis     Urinary calculus, unspecified     Hyperoxaluria     High urine sodium     Hypercalciuria     ADHD (attention deficit  "hyperactivity disorder), inattentive type             Chief Complaint / Subjective:    Chief complaint: \" I am doing okay\"     History of Present Illness:   Per patient statement-she restarted her Strattera and has been doing okay.  She had some stomach upset initially but is now tolerating it apart from feeling constipated .  She is drinking lots of fluids and trying to eat foods rich in fiber.  After a few weeks of taking Strattera she noted that she was able to concentrate better.  She has been busy with school and has not had time to find a therapist of her choice but plans on doing that during the holiday break.  She denies all other psychiatric issues and offers no new concerns.  Would like to continue the same medications.    Mental Status Examination:   General: Adequate hygiene, cooperative  Speech: Normal in rate and tone  Language: Intact  Thought process: Coherent  Thought content:                           Auditory hallucinations-Denies                          Visual Hallucinations - Denies                         Delusions Absent                           Loose Associations:  No                          Suicidal thoughts: Denies                          Homicidal thoughts:Denies                        Affect: Neutral  Mood: Neutral   Intellectual functioning: Within normal limits  Memory: Within normal limits  Fund of knowledge: Average  Attention and concentration: Within normal limits  Gait: Steady  Psychomotor activity: Calm, no agitation  Muscles: No atrophy, no abnormal movements  InSight and judgment: Fair      Drug/treatment history and current pattern of use:   Denies     Medication changes: See Above   Medication adherence: compliant  Medication side effects: absent  Information about medications: Side effects, benefits and alternative treatments discussed and patient agrees .    Psychotherapy: Supportive therapy day-to-day living    Education: Diet, exercise, abstinence from drugs and " alcohol, patient will not drive if sedated and medications or  under influence of any substance    Lab Results:   Personally reviewed and discussed with the patient    Lab Results   Component Value Date    WBC 7.3 08/07/2016    HGB 14.0 08/07/2016    HCT 41.2 08/07/2016     08/07/2016    CHOL 143 05/23/2014    TRIG 29 05/23/2014    HDL 55 05/23/2014    ALT 14 05/23/2014    AST 19 05/23/2014     08/18/2016    K 4.2 08/18/2016     08/18/2016    CREATININE 0.69 08/18/2016    CREATININE 0.67 08/18/2016    BUN 11 08/18/2016    CO2 27 08/18/2016    TSH 2.0 06/06/2012       Vital signs:  There were no vitals taken for this visit.  Vitals:    11/20/19 0840   BP: 126/85   Pulse: 88     Allergies: Patient has no known allergies.         Medications:     Current Outpatient Medications on File Prior to Visit   Medication Sig Dispense Refill     KARIVA, 28, 0.15-0.02 mgx21 /0.01 mg x 5 per tablet TAKE 1 TABLET BY MOUTH ONCE DAILY  2     spironolactone (ALDACTONE) 50 MG tablet Take 50 mg by mouth daily.  4     tretinoin (RETIN-A) 0.1 % cream        [DISCONTINUED] atomoxetine (STRATTERA) 40 MG capsule TAKE 1 CAPSULE BY MOUTH DAILY FOR 3 DAYS THEN 1 CAPSULE TWICE DAILY THEREAFTER 180 capsule 0     [DISCONTINUED] desogestrel-ethinyl estradiol (APRI) 0.15-0.03 mg per tablet        [DISCONTINUED] sertraline (ZOLOFT) 100 MG tablet Take 1 tablet (100 mg total) by mouth daily. 90 tablet 1     No current facility-administered medications on file prior to visit.               Review of Systems:      ROS:       10 point ROS was negative except for the items listed in HPI.    Review of Systems - General ROS: negative for - chills, fatigue, night sweats, sleep disturbance or weight loss    Respiratory ROS: no cough, shortness of breath, or wheezing  negative for - cough or shortness of breath  Cardiovascular ROS: no chest pain or dyspnea on exertion  Gastrointestinal ROS: no abdominal pain, change in bowel habits, or black  or bloody stools  Musculoskeletal ROS: negative  negative for - gait disturbance, joint pain, joint stiffness, muscle pain or muscular weakness  Neurological ROS: negative for - confusion, gait disturbance, headaches or seizures    Coordination of Care:   More than 25 minutes spent on this visit  with more than 50% of time spent on coordination of care including: Educating patient about diagnosis, prognosis, side effects and benefits of medications, diet, exercise.  Time also spent providing supportive therapy regarding above issues.    This note was created using a dictation system. All typing errors or contextual distortion is unintentional and software inherent.

## 2021-06-04 VITALS
SYSTOLIC BLOOD PRESSURE: 126 MMHG | HEIGHT: 63 IN | BODY MASS INDEX: 20.73 KG/M2 | DIASTOLIC BLOOD PRESSURE: 85 MMHG | HEART RATE: 88 BPM | WEIGHT: 117 LBS

## 2021-06-07 NOTE — PROGRESS NOTES
"Malini Mcclain is a 25 y.o. female who is being evaluated via a billable telephone visit.      The patient has been notified of following:     \"This telephone visit will be conducted via a call between you and your physician/provider. We have found that certain health care needs can be provided without the need for a physical exam.  This service lets us provide the care you need with a short phone conversation.  If a prescription is necessary we can send it directly to your pharmacy.  If lab work is needed we can place an order for that and you can then stop by our lab to have the test done at a later time.    Telephone visits are billed at different rates depending on your insurance coverage. During this emergency period, for some insurers they may be billed the same as an in-person visit.  Please reach out to your insurance provider with any questions.    If during the course of the call the physician/provider feels a telephone visit is not appropriate, you will not be charged for this service.\"    Patient has given verbal consent to a Telephone visit? Yes    Malini Mcclain complains of    Chief Complaint   Patient presents with     Med check   Depression / anxiety - She has been taking sertraline 100 mg daily.  She feels little more depressed lately.  She is living with parents and doing school on-line. She is not able to see her boyfriend and other friends.  Parents very cautious about letting her go out for walks with friends.  Has not been doing any counseling. Was seeing psychiatrist - wanted to consider meds for ADD but in the end she did not take Strattera.  She feels future is very uncertain and not sure how her education will go.  No side effects from sertraline.  She does feel it is helping some.    I have reviewed and updated the patient's Past Medical History, Social History, Family History and Medication List.    ALLERGIES  Patient has no known allergies.    Patient Active Problem List   Diagnosis "     Esophageal Reflux     Generalized Anxiety Disorder     Acne     Scoliosis     Menorrhagia     Bronchospasm     Mild recurrent major depression (H)     Urinary calculus, unspecified     Hyperoxaluria     High urine sodium     Hypercalciuria     ADHD (attention deficit hyperactivity disorder), inattentive type        Assessment/Plan:  1. Generalized anxiety disorder / Depression  Continue sertraline for now.  Will not change meds.  I think situation is also affecting mood.  - sertraline (ZOLOFT) 100 MG tablet; Take 1 tablet (100 mg total) by mouth daily.  Dispense: 90 tablet; Refill: 1    Patient Instructions   Consider taking L - theanine 100 - 200 mg two times a day for anxiety.    Consider Bacopa ( herb ) to improve concentration. Can purchase from following website:    To order supplements go to the web site: [employer]  Use my authorization number to order the recommended supplements: S99     Keen Mind    Consider [facility] for counseling    Schedule a daily routine.  Turn off electronics 1 hour before bedtime.  Get out for a daily walk.    F/U if not doing better.           Phone call duration:  12 minutes    AMY Rebollar

## 2021-06-07 NOTE — PATIENT INSTRUCTIONS - HE
Consider taking L - theanine 100 - 200 mg two times a day for anxiety.    Consider Bacopa ( herb ) to improve concentration. Can purchase from following website:    To order supplements go to the web site: [employer]  Use my authorization number to order the recommended supplements: S99     [facility]    Consider [facility] for counseling    Schedule a daily routine.  Turn off electronics 1 hour before bedtime.  Get out for a daily walk.    F/U if not doing better.

## 2021-06-07 NOTE — TELEPHONE ENCOUNTER
Date of Last Office Visit: 11/20/2019  Date of Next Office Visit: none (central scheduling will contact patient  No shows since last visit: none  Cancellations since last visit: 1 patient   ED visits since last visit:  none  Medication sertraline 100 mg  date last ordered: 11/20/2019  Qty: 90  Refills: 0  Lapse in therapy greater than 7 days: yes  Medication refill request verified as identical to current order: yes  Result of Last DAM, VPA, Li+ Level, CBC, or Carbamazepine Level (at or since last visit): N/A     [] Medication refilled per [facility] M-1.   [x] Medication unable to be refilled by RN due to criteria not met as indicated below:     []Eligibility - not seen in last year    []Supervision - no future appointment    [x]Compliance     []Verification - order discrepancy    []Controlled Medication    []Medication not included in RN Protocol    [x]90 - day supply request    []Other     Current Medication list:  Malini Mcclain    (MRN [id number])   Your Current Medications Are     atomoxetine (STRATTERA) 40 MG capsule  Take 1 CAPSULE TWICE DAILY    KARIVA, 28, 0.15-0.02 mgx21 /0.01 mg x 5 per tablet  TAKE 1 TABLET BY MOUTH ONCE DAILY    sertraline (ZOLOFT) 100 MG tablet  Take 1 tablet (100 mg total) by mouth daily.    spironolactone (ALDACTONE) 50 MG tablet  Take 50 mg by mouth daily.    tretinoin (RETIN-A) 0.1 % cream         Medication Plan of Care at last office visit with MD/CNP:  Last visit 10/23/19  Recommendation at last visit   1. Highly recommend psychotherapy -  referral to psychology  made during initial visit-  2. Restart  Strattera 40 mg daily for 3 days and increase dose to 40 mg twice daily taking 1 dose early in the morning and that second dose early in the evening.Continue Zoloft 100 mg daily   5.  Return to the clinic in 4 weeks for follow-up appointment call in between visits any questions or concerns  6.  Highly recommend  psychotherapy.  Also recommend neuropsychiatric evaluation  Patient and I  reviewed diagnosis and treatment plan and patient agrees with following recommendations:  Ongoing education given regarding diagnostic and treatment options with adequate verbalization of understanding.  Plan   1. Highly recommend psychotherapy -  referral to psychology  made during initial visit-is yet to  appointment but intends to   2. .Continue Zoloft 100 mg daily , Strattera 40 mg two times a day   5.  Return to the clinic in 4 weeks for follow-up appointment call in between visits any questions or concerns  6.  Highly recommend  psychotherapy.  Also recommend neuropsychiatric evaluation

## 2021-06-08 NOTE — PROGRESS NOTES
Assessment/Plan:        Diagnoses and all orders for this visit:    Hypercalciuria  -     Calcium Oxalate Stone Prevention Education    Urinary calculus, unspecified  -     [facility] urinalysis dipstick[facility]    Other orders  -     tazarotene (AVAGE) 0.1 % cream; Apply topically bedtime.      Stone Management Plan  [facility] 8/18/2016 11/22/2016 1/12/2017   Urinary Tract Infection No suspicion of infection No suspicion of infection No suspicion of infection   Renal Colic Asymptomatic at this time Asymptomatic at this time Asymptomatic at this time   Renal Failure No suspicion of renal failure No suspicion of renal failure No suspicion of renal failure   Current CT date 8/18/2016 - -   Right sided stones? No - -   R Number of ureteral stones - - -   R GSD of ureteral stones - - -   R Location of ureteral stone - - -   R Number of kidney stones  - - -   R GSD of kidney stones - - -   R Hydronephrosis None - -   R Stone Event Resolved event No current event No current event   Diagnosis date 8/18/2016 - -   Initial location of primary symptomatic stone - - -   Initial GSD of primary symptomatic stone - - -   R MET status Passed - -   R Current Plan - - -   MET - - -   Left sided stones? No - -   L Stone Event No current event No current event No current event             Subjective:      HPI  Ms. Malini Mcclain is a 21 y.o.  female returning to the [facility] [place] for follow up of her stone disease.    She has not had any acute stone issues. She has tried to be diligent about sticking to a low oxalate, low sodium diet. She finds the dietary restrictions challenging.    24 hour urine is reviewed and demonstrates resolution of hypercalciuria and hyperoxaluria. Her urine sodium is slightly elevated but she collected on New Years day and was unable to avoid salty foods. The sodium moves through the system before the calcium excretion bumps up.    Overall she is on the right path. She  should continue to focus on sodium restriction as the elevated calcium loss will be problematic for bone health. Will follow on a PRN basis.     ROS   Review of systems is negative except for HPI.    Past Medical History   Diagnosis Date     Anxiety      GERD (gastroesophageal reflux disease)      Kidney stone        Past Surgical History   Procedure Laterality Date     No past surgeries         Current Outpatient Prescriptions   Medication Sig Dispense Refill     ALPRAZolam (XANAX) 0.5 MG tablet Take 1 tablet (0.5 mg total) by mouth daily as needed for sleep or anxiety. 30 tablet 0     KARIVA, 28, 0.15-0.02 mgx21 /0.01 mg x 5 per tablet TAKE 1 TABLET BY MOUTH ONCE DAILY  2     sertraline (ZOLOFT) 50 MG tablet TAKE 1&1/2 TABS BY MOUTH DAILY 135 tablet 1     tazarotene (AVAGE) 0.1 % cream Apply topically bedtime.       No current facility-administered medications for this visit.        No Known Allergies    Social History     Social History     Marital status: Single     Spouse name: N/A     Number of children: N/A     Years of education: N/A     Occupational History     Student      Social History Main Topics     Smoking status: Never Smoker     Smokeless tobacco: Never Used     Alcohol use Yes      Comment: occas     Drug use: No     Sexual activity: Not on file     Other Topics Concern     Not on file     Social History Narrative       Family History   Problem Relation Age of Onset     Hearing loss Mother      Alcohol abuse Maternal Uncle      Breast cancer Maternal Grandmother      Cancer Maternal Grandmother      Cancer Maternal Grandfather      Urolithiasis Maternal Grandfather      recurrent     Vision loss Paternal Grandmother      Diabetes Paternal Grandfather      Heart disease Paternal Grandfather      Hypertension Paternal Grandfather      Gout Neg Hx      Objective:      Physical Exam  Vitals:    01/12/17 1308   BP: 113/64   Pulse: (!) 57   Temp: 97.4  F (36.3  C)     General - well developed, well  nourished, appropriate for age. Appears no distress at this time  Abdomen - slender soft, non-tender, no hepatosplenomegaly, no masses.   - no flank tenderness, no suprapubic tenderness, kidney and bladder non-palpable  MSK - normal spinal curvature. no spinal tenderness. normal gait. muscular strength intact.  Psych - oriented to time, place, and person, normal mood and affect.      Labs   Urinalysis POC (Office):  BLOOD UA   Date Value Ref Range Status   01/12/2017 Negative Negative Final   11/22/2016 Negative Negative Final   08/18/2016 Trace Negative Final     NITRITE, UA   Date Value Ref Range Status   01/12/2017 Negative Negative Final   11/22/2016 Negative Negative Final   08/18/2016 Negative Negative Final   08/07/2016 Negative Negative Final   05/29/2013 Negative (Negative) Final     LEUKOCYTES, UA    Date Value Ref Range Status   01/12/2017 Trace (!) Negative Final   11/22/2016 Trace (!) Negative Final   08/18/2016 Negative Negative Final     PH UA   Date Value Ref Range Status   01/12/2017 6.5 4.5 - 8.0 Final   11/22/2016 6.5 4.5 - 8.0 Final   08/18/2016 6.0 4.5 - 8.0 Final       Lab Urinalysis:  BLOOD, UA   Date Value Ref Range Status   08/07/2016 Moderate (!) Negative Final   05/29/2013 Negative (Negative) Final     NITRITE, UA   Date Value Ref Range Status   01/12/2017 Negative Negative Final   11/22/2016 Negative Negative Final   08/18/2016 Negative Negative Final   08/07/2016 Negative Negative Final   05/29/2013 Negative (Negative) Final     LEUKOCYTES, UA   Date Value Ref Range Status   08/07/2016 Negative Negative Final   05/29/2013 Negative (Negative) Final     PH, UA   Date Value Ref Range Status   08/07/2016 6.5 4.5 - 8.0 Final   05/29/2013 5.5 (5.0-8.0) Final    and Stone prevention labs   24 hour urine   CALCIUM, 24H URINE   Date Value Ref Range Status   01/02/2017 224 20 - 275 mg/24hr Final     Comment:       Hypercalciuria >350  Values are for persons with  average daily calcium  intake  (600-800 mg/day)   08/25/2016 334 (H) 20 - 275 mg/24hr Final     Comment:       Hypercalciuria >350  Values are for persons with  average daily calcium intake  (600-800 mg/day)   08/20/2016 147 20 - 275 mg/24hr Final     Comment:       Hypercalciuria >350  Values are for persons with  average daily calcium intake  (600-800 mg/day)     SODIUM, 24 HOUR URINE   Date Value Ref Range Status   01/02/2017 188 40 - 217 mmol/24hr Final   08/25/2016 148 40 - 217 mmol/24hr Final   08/20/2016 164 40 - 217 mmol/24hr Final     CITRATE, 24 HOUR URINE   Date Value Ref Range Status   01/02/2017 352 157 - 1191 mg/24hr Final     Comment:       Reference Ranges are not  established for 0-19 years  or >60 years of age.   08/25/2016 518 157 - 1191 mg/24hr Final     Comment:       Reference Ranges are not  established for 0-19 years  or >60 years of age.   08/20/2016 229 157 - 1191 mg/24hr Final     Comment:       Reference Ranges are not  established for 0-19 years  or >60 years of age.     OXALATE, 24 HOUR URINE   Date Value Ref Range Status   01/02/2017 32.8 7.0 - 44.0 mg/24hr Final   08/25/2016 23.2 7.0 - 44.0 mg/24hr Final   08/20/2016 53.1 (H) 7.0 - 44.0 mg/24hr Final     PH, URINE   Date Value Ref Range Status   01/02/2017 6.5 4.5 - 8.0 Final   08/25/2016 6.5 4.5 - 8.0 Final   08/20/2016 6.0 4.5 - 8.0 Final     URINE VOLUME   Date Value Ref Range Status   01/02/2017 2800 mL Final   08/25/2016 2550 mL Final   08/20/2016 2725 mL Final

## 2021-06-14 NOTE — TELEPHONE ENCOUNTER
Refill Approved    Rx renewed per Medication Renewal Policy. Medication was last renewed on 09/10/2020.  Last office visit was 04/07/2020 with PCP.    Gloria Selby, [facility] Triage/Med Refill 1/5/2021     Requested Prescriptions   Pending Prescriptions Disp Refills     sertraline (ZOLOFT) 50 MG tablet [Pharmacy Med Name: SERTRALINE HCL 50 MG TABLET] 135 tablet 1     Sig: TAKE 1 AND 1/2 TABLETS (75MG) BY MOUTH ONCE DAILY       [facility] Refill Protocol  Passed - 1/5/2021 12:46 AM        Passed - PCP or prescribing provider visit in last year     Last office visit with prescriber/PCP: Visit date not found OR same dept: Visit date not found OR same specialty: Visit date not found  Last physical: 10/2/2019 Last MTM visit: Visit date not found   Next visit within 3 mo: Visit date not found  Next physical within 3 mo: Visit date not found  Prescriber OR PCP: Rachael Em MD  Last diagnosis associated with med order: 1. Mild recurrent major depression (H)  - sertraline (ZOLOFT) 50 MG tablet [Pharmacy Med Name: SERTRALINE HCL 50 MG TABLET]; TAKE 1 AND 1/2 TABLETS (75MG) BY MOUTH ONCE DAILY  Dispense: 135 tablet; Refill: 1    If protocol passes may refill for 12 months if within 3 months of last provider visit (or a total of 15 months).

## 2021-06-16 NOTE — TELEPHONE ENCOUNTER
RN cannot approve Refill Request    RN can NOT refill this medication PCP messaged that patient is overdue for Office Visit. Last office visit: Visit date not found Last Physical: 10/2/2019 Last MTM visit: Visit date not found Last visit same specialty: Visit date not found.  Next visit within 3 mo: Visit date not found  Next physical within 3 mo: Visit date not found      Annemarie Way, [facility] Triage/Med Refill 4/13/2021    Requested Prescriptions   Pending Prescriptions Disp Refills     sertraline (ZOLOFT) 50 MG tablet [Pharmacy Med Name: SERTRALINE HCL 50 MG TABLET] 45 tablet 2     Sig: TAKE 1 AND 1/2 TABLETS (75MG) BY MOUTH ONCE DAILY       [facility] Refill Protocol  Failed - 4/13/2021 12:31 AM        Failed - PCP or prescribing provider visit in last year     Last office visit with prescriber/PCP: Visit date not found OR same dept: Visit date not found OR same specialty: Visit date not found  Last physical: 10/2/2019 Last MTM visit: Visit date not found   Next visit within 3 mo: Visit date not found  Next physical within 3 mo: Visit date not found  Prescriber OR PCP: Rachael Em MD  Last diagnosis associated with med order: 1. Mild recurrent major depression (H)  - sertraline (ZOLOFT) 50 MG tablet [Pharmacy Med Name: SERTRALINE HCL 50 MG TABLET]; TAKE 1 AND 1/2 TABLETS (75MG) BY MOUTH ONCE DAILY  Dispense: 45 tablet; Refill: 2    If protocol passes may refill for 12 months if within 3 months of last provider visit (or a total of 15 months).

## 2021-06-16 NOTE — TELEPHONE ENCOUNTER
Telephone Encounter by Lyly Doe RN at 11/4/2019 12:18 PM     Author: Lyly Doe RN Service: -- Author Type: Registered Nurse    Filed: 11/4/2019 12:23 PM Encounter Date: 11/4/2019 Status: Signed    : Lyly Doe RN (Registered Nurse)       Date of Last Office Visit: 10-23-19  Date of Next Office Visit: 11-20-19  No shows since last visit: 0  Cancellations since last visit: 0  ED visits since last visit:  0  Medication Strattera  date last ordered: 10-9-19  Qty: 60  Refills: 0  Lapse in therapy greater than 7 days: no  Medication refill request verified as identical to current order: yes  Result of Last DAM, VPA, Li+ Level, CBC, or Carbamazepine Level (at or since last visit): N/A     [] Medication refilled per [facility] M-1.   [x] Medication unable to be refilled by RN due to criteria not met as indicated below:     []Eligibility - not seen in last year    []Supervision - no future appointment    []Compliance     []Verification - order discrepancy    [x]Controlled Medication    []Medication not included in RN Protocol    []90 - day supply request    []Other   Current Medication list:    atomoxetine (STRATTERA) 40 MG capsule Take 40 mg daily for 3 days then 40 mg twice daily thereafter   KARIVA, 28, 0.15-0.02 mgx21 /0.01 mg x 5 per tablet TAKE 1 TABLET BY MOUTH ONCE DAILY   sertraline (ZOLOFT) 100 MG tablet Take 1 tablet (100 mg total) by mouth daily.       Medication Plan of Care at last office visit with MD/CNP:  1. Highly recommend psychotherapy -  referal to psychology  made during initial visit-  2. Restart  Strattera 40 mg daily for 3 days and increase dose to 40 mg twice daily taking 1 dose early in the morning and that second dose early in the evening.Continue loft 100 mg daily   5.  Return to the clinic in 4 weeks for follow-up appointment call in between visits any questions or concerns  6.  Highly recommend  psychotherapy.  Also recommend neuropsychiatric evaluation.

## 2021-06-16 NOTE — TELEPHONE ENCOUNTER
Telephone Encounter by Anastasiia Gagnon LPN at 11/13/2019  3:29 PM     Author: Anastasiia Gagnon LPN Service: -- Author Type: Licensed Nurse    Filed: 11/13/2019  3:42 PM Encounter Date: 11/13/2019 Status: Signed    : Anastasiia Gagnon LPN (Licensed Nurse)       Date of Last Office Visit: 10/23/19  Date of Next Office Visit: 11/20/19  No shows since last visit: no  Cancellations since last visit: no  ED visits since last visit: no    Rx for Strattera provided on 11/5/19 for 30 days but patient is requesting authorization to dispense a 90 day supply    atomoxetine (STRATTERA) 40 MG capsule 60 capsule 0 11/5/2019  No   Sig: TAKE 1 CAPSULE BY MOUTH DAILY FOR 3 DAYS THEN 1 CAPSULE TWICE DAILY THEREAFTER       Lapse in therapy greater than 7 days: no  Medication refill request verified as identical to current order: yes except asking for a 90 day supply  Result of Last DAM, VPA, Li+ Level, CBC, or Carbamazepine Level (at or since last visit): DAM was negative on 10/9/19        []Eligibility - not seen in last year    []Supervision - no future appointment    []Compliance     []Verification - order discrepancy    []Controlled Medication    [x]90 - day supply request    [x]Other LPN pending medications    Current Medication list:      atomoxetine (STRATTERA) 40 MG capsule TAKE 1 CAPSULE BY MOUTH DAILY FOR 3 DAYS THEN 1 CAPSULE TWICE DAILY THEREAFTER   KARIVA, 28, 0.15-0.02 mgx21 /0.01 mg x 5 per tablet TAKE 1 TABLET BY MOUTH ONCE DAILY   sertraline (ZOLOFT) 100 MG tablet Take 1 tablet (100 mg total) by mouth daily.     Medication Plan of Care at last office visit with MD/CNP:    PLAN:    Plan      1. Highly recommend psychotherapy -  referal to psychology  made during initial visit-  2. Restart  Strattera 40 mg daily for 3 days and increase dose to 40 mg twice daily taking 1 dose early in the morning and that second dose early in the evening.Continue loft 100 mg daily   5.  Return to the  clinic in 4 weeks for follow-up appointment call in between visits any questions or concerns  6.  Highly recommend  psychotherapy.  Also recommend neuropsychiatric evaluation.  [place], [place], [place], and [place] :Reviewed and no worrisome pharmacy activity noted

## 2021-06-16 NOTE — TELEPHONE ENCOUNTER
Telephone Encounter by Ambar Hollins RN at 10/17/2019  1:13 PM     Author: Ambar Hollins RN Service: Psychiatry Author Type: Registered Nurse    Filed: 10/17/2019  1:14 PM Encounter Date: 10/14/2019 Status: Signed    : Ambar Hollins RN (Registered Nurse)       Call placed to Malini with Amanda Perez CNP, directive:     Amanda Perez, Maryanne Martinez;  Mental Health Support Pool 45 minutes ago (12:26 PM)      It is really up to to patient. If I am not prescribing her med's then she does not need to reschedule       Message left for Malini to call back.

## 2021-06-16 NOTE — PROGRESS NOTES
Malini Mcclain is a 26 y.o. female who is being evaluated via a billable video visit.      How would you like to obtain your AVS? MyChart.  If dropped from the video visit, the video invitation should be resent by: Send to e-mail at: [email or web address]  Will anyone else be joining your video visit? No    Video Start Time: 8:59 am    Assessment & Plan     Malini was seen today for follow-up.    Diagnoses and all orders for this visit:    Generalized anxiety disorder  -     sertraline (ZOLOFT) 50 MG tablet; Take 1 1/2 po daily    Doing well.  We discussed considering weaning med in the future once she feels stable at school.    Prescription drug managementReturn in about 1 year (around 4/19/2022) for Recheck.    Rachael Em MD  [facility]   Malini Mcclain is 26 y.o. and presents today for the following health issues   HPI She is here to f/u for anxiety.  Has been taking sertraline 75 mg daily.  Had some anxiety with applying to school but better now.  Has not needed to take lorazepam.    She has been working as nursing assistant.  She was on furlough a few months but back to work.  She is going to be going to graduate school to be a P.A. in [place].      She tested positive for COVID 19 in November but did not have any real symptoms.  She has been vaccinated now.      Review of Systems  No side effects from medication      Objective       Vitals:  No vitals were obtained today due to virtual visit.    Physical Exam  GENERAL: Healthy, alert and no distress  EYES: Eyes grossly normal to inspection. No discharge or erythema, or obvious scleral/conjunctival abnormalities.  RESP: No audible wheeze, cough, or visible cyanosis.  No visible retractions or increased work of breathing.    NEURO: Cranial nerves grossly intact. Mentation and speech appropriate for age.  PSYCH: Mentation appears normal, affect normal/bright, judgement and insight intact, normal speech  and appearance well-groomed    This was converted to phone visit due to technical difficulties. Total time on phone: 11 minutes.    Rachael Em

## 2021-06-16 NOTE — TELEPHONE ENCOUNTER
Call pt - due for visit before further refills.    The [facility] clinic is permanently closed.  I will be practicing at the [facility].  I am currently doing both face to face and virtual visits ( phone/ video).  If you feel that it is too far to travel there for care and/ or prefer a provider in the [facility], I highly recommend establishing care with one of the following two new providers:  Dr. Kacy Peter at [facility] or Dr. Marci Ring at [facility].

## 2021-06-17 NOTE — TELEPHONE ENCOUNTER
New Appointment Needed  What is the reason for the visit:    Mantoux Placement  Appt Request  What is the purpose of the mantoux?:  School: [employer]  Is there a form to be completed?:   Yes  How soon do you need the mantoux placed?:  date: May 1 st    Provider Preference: [facility]  Nurse   How soon do you need to be seen?: tomorrow  Waitlist offered?: No  Okay to leave a detailed message:  Yes

## 2021-06-19 NOTE — LETTER
Letter by Rachael Em MD at      Author: Rachael Em MD Service: -- Author Type: --    Filed:  Encounter Date: 10/2/2019 Status: Signed         [facility] FAMILY MEDICINE/OB  10/02/19    Patient: Malini Mcclain  YOB: 1995  Medical Record Number: [id number]  CSN: [id number]                                                                              Non-opioid Controlled Substance Agreement    I understand that my care provider has prescribed a controlled substance to help manage my condition(s). I am taking this medicine to help me function or work. I know this is strong medicine, and that it can cause serious side effects. Controlled substances can be sedating, addicting and may cause a dependency on the drug. They can affect my ability to drive or think, and cause depression. They need to be taken exactly as prescribed. Combining controlled substances with certain medicines or chemicals (such as cocaine, sedatives and tranquilizers, sleeping pills, meth) can be dangerous or even fatal. Also, if I stop controlled substances suddenly, I may have severe withdrawal symptoms.  If not helpful, I may be asked to stop them.    The risks, benefits, and side effects of these medicine(s) were explained to me. I agree that:    1. I will take part in other treatments as advised by my care team. This may be psychiatry or counseling, physical therapy, behavioral therapy, group treatment or a referral to a pain clinic. I will reduce or stop my medicine when my care team tells me to do so.  2. I will take my medicines as prescribed. I will not change the dose or schedule unless my care team tells me to. There will be no refills if I run out early.  I may be contactedwithout warning and asked to complete a urine drug test or pill count at any time.   3. I will keep all my appointments, and understand this is part of the monitoring of controlled substances. My care team may require  an office visit for EVERY controlled substance refill. If I miss appointments or dont follow instructions, my care team may stop my medicine.  4. I will not ask other providers to prescribe controlled substances, and I will not accept controlled substances from other people. If I need another prescribed controlled substance for a new reason, I will tell my care team within 1 business day.  5. I will use one pharmacy to fill all of my controlled substance prescriptions, and it is up to me to make sure that I do not run out of my medicines on weekends or holidays. If my care team is willing to refill my controlled substance prescription without a visit, I must request refills only during office hours, refills may take up to 3 days to process, and it may take up to 5 to 7 days for my medicine to be mailed and ready at my pharmacy. Prescriptions will not be mailed anywhere except my pharmacy.    6. I am responsible for my prescriptions. If the medicine/prescription is lost or stolen, it will not be replaced. I also agree not to share controlled substance medicines with anyone.          [facility] FAMILY MEDICINE/OB  10/02/19  Patient:  Malini Mcclain  YOB: 1995  Medical Record Number: [id number]  CSN: [id number]    7. I agree to not use ANY illegal or recreational drugs. This includes marijuana, cocaine, bath salts or other drugs. I agree not to use alcohol unless my care team says I may. I agree to give urine samples whenever asked. If I dont give a urine sample, the care team may stop my medicine.    8. If I enroll in the [place] Medical Marijuana program, I will tell my care team. I will also sign an agreement to share my medical records with my care team.    9. I will bring in my list of medicines (or my medicine bottles) each time I come to the clinic.   10. I will tell my care team right away if I become pregnant or have a new medical problem treated outside of my regular clinic.  11. I  understand that this medicine can affect my thinking and judgment. It may be unsafe for me to drive, use machinery and do dangerous tasks. I will not do any of these things until I know how the medicine affects me. If my dose changes, I will wait to see how it affects me. I will contact my care team if I have concerns about medicine side effects.    I understand that if I do not follow any of the conditions above, my prescriptions or treatment may be stopped.      I agree that my provider, clinic care team, and pharmacy may work with any city, state or federal law enforcement agency that investigates the misuse, sale, or other diversion of my controlled medicine. I will allow my provider to discuss my care with or share a copy of this agreement with any other treating provider, pharmacy or emergency room where I receive care. I agree to give up (waive) any right of privacy or confidentiality with respect to these consents.   I have read this agreement and have asked questions about anything I did not understand.    Adderall XR 10 mg 1-2 po daily  #60 for 1 month   Visits every 6 months    ___________________________________________________________________________  Patient signature - Date/Time  -Malini Mcclain                                      ___________________________________________________________________________  Witness signature                                                                    ___________________________________________________________________________  Provider signature- Rachael Em MD

## 2021-06-25 NOTE — TELEPHONE ENCOUNTER
Requested Prescriptions     Pending Prescriptions Disp Refills     sertraline (ZOLOFT) 50 MG tablet 135 tablet 3     Sig: Take 1 1/2 po daily     Pended RX to new pharmacy if OK.

## 2021-06-28 NOTE — PROGRESS NOTES
Progress Notes by Lynsey Hicks CMA at 10/23/2019  8:30 AM     Author: Lynsey Hicks CMA Service: Psychiatry Author Type: Certified Medical Assistant    Filed: 10/23/2019  9:29 AM Encounter Date: 10/23/2019 Status: Signed    : Lynsey Hicks CMA (Certified Medical Assistant)       Pt is here for psychiatric follow up and medication management.  Pt called in stating she was running late. Pt stated she woke up way late      [place] :

## 2021-06-28 NOTE — PROGRESS NOTES
Progress Notes by Lynsey Hicks CMA at 10/9/2019  8:30 AM     Author: Lynsey Hicks CMA Service: Psychiatry Author Type: Certified Medical Assistant    Filed: 10/9/2019 10:03 AM Encounter Date: 10/9/2019 Status: Signed    : Lynsey Hicks CMA (Certified Medical Assistant)       Pt is here to establish care.  Referral from Dr. Rachael Em [facility]  [place] obtained for         [place] : Also verified [place] Gov; [place], [place] for prior medications hx.

## 2021-06-28 NOTE — PROGRESS NOTES
Progress Notes by Lynsey Hicks CMA at 11/20/2019  8:30 AM     Author: Lynsey Hicks CMA Service: Psychiatry Author Type: Certified Medical Assistant    Filed: 11/20/2019  9:03 AM Encounter Date: 11/20/2019 Status: Signed    : Lynsey Hicks CMA (Certified Medical Assistant)       Pt is here for psychiatric follow up and medication management.      [place] :

## 2021-08-29 ENCOUNTER — HEALTH MAINTENANCE LETTER (OUTPATIENT)
Age: 26
End: 2021-08-29

## 2021-10-24 ENCOUNTER — HEALTH MAINTENANCE LETTER (OUTPATIENT)
Age: 26
End: 2021-10-24

## 2021-12-19 ENCOUNTER — HEALTH MAINTENANCE LETTER (OUTPATIENT)
Age: 26
End: 2021-12-19

## 2022-10-15 ENCOUNTER — HEALTH MAINTENANCE LETTER (OUTPATIENT)
Age: 27
End: 2022-10-15

## 2023-03-26 ENCOUNTER — HEALTH MAINTENANCE LETTER (OUTPATIENT)
Age: 28
End: 2023-03-26

## 2023-04-07 ENCOUNTER — TRANSFERRED RECORDS (OUTPATIENT)
Dept: MULTI SPECIALTY CLINIC | Facility: CLINIC | Age: 28
End: 2023-04-07

## 2023-04-07 LAB — PAP SMEAR - HIM PATIENT REPORTED: NORMAL

## 2024-01-29 ASSESSMENT — ENCOUNTER SYMPTOMS
FEVER: 0
DIZZINESS: 0
HEARTBURN: 0
NERVOUS/ANXIOUS: 0
CHILLS: 0
HEMATURIA: 0
NAUSEA: 0
MYALGIAS: 0
COUGH: 0
SHORTNESS OF BREATH: 0
CONSTIPATION: 0
HEADACHES: 0
WEAKNESS: 0
DYSURIA: 0
JOINT SWELLING: 0
ARTHRALGIAS: 0
PARESTHESIAS: 0
DIARRHEA: 0
HEMATOCHEZIA: 0
BREAST MASS: 0
PALPITATIONS: 0
ABDOMINAL PAIN: 0
SORE THROAT: 0
FREQUENCY: 0
EYE PAIN: 0

## 2024-02-05 ENCOUNTER — OFFICE VISIT (OUTPATIENT)
Dept: FAMILY MEDICINE | Facility: CLINIC | Age: 29
End: 2024-02-05
Payer: COMMERCIAL

## 2024-02-05 VITALS
DIASTOLIC BLOOD PRESSURE: 62 MMHG | HEIGHT: 63 IN | RESPIRATION RATE: 16 BRPM | OXYGEN SATURATION: 100 % | WEIGHT: 115.8 LBS | BODY MASS INDEX: 20.52 KG/M2 | TEMPERATURE: 97.8 F | SYSTOLIC BLOOD PRESSURE: 100 MMHG | HEART RATE: 80 BPM

## 2024-02-05 DIAGNOSIS — F41.1 GENERALIZED ANXIETY DISORDER: ICD-10-CM

## 2024-02-05 DIAGNOSIS — Z00.00 ROUTINE HISTORY AND PHYSICAL EXAMINATION OF ADULT: Primary | ICD-10-CM

## 2024-02-05 DIAGNOSIS — F90.0 ATTENTION DEFICIT HYPERACTIVITY DISORDER (ADHD), PREDOMINANTLY INATTENTIVE TYPE: Chronic | ICD-10-CM

## 2024-02-05 DIAGNOSIS — F33.0 MILD RECURRENT MAJOR DEPRESSION (H): ICD-10-CM

## 2024-02-05 DIAGNOSIS — L71.0 PERIORAL DERMATITIS: ICD-10-CM

## 2024-02-05 LAB
ALBUMIN SERPL BCG-MCNC: 4.4 G/DL (ref 3.5–5.2)
ALP SERPL-CCNC: 42 U/L (ref 40–150)
ALT SERPL W P-5'-P-CCNC: 21 U/L (ref 0–50)
ANION GAP SERPL CALCULATED.3IONS-SCNC: 9 MMOL/L (ref 7–15)
AST SERPL W P-5'-P-CCNC: 26 U/L (ref 0–45)
BILIRUB SERPL-MCNC: 0.5 MG/DL
BUN SERPL-MCNC: 10.7 MG/DL (ref 6–20)
CALCIUM SERPL-MCNC: 9.2 MG/DL (ref 8.6–10)
CHLORIDE SERPL-SCNC: 102 MMOL/L (ref 98–107)
CHOLEST SERPL-MCNC: 159 MG/DL
CREAT SERPL-MCNC: 0.68 MG/DL (ref 0.51–0.95)
DEPRECATED HCO3 PLAS-SCNC: 27 MMOL/L (ref 22–29)
EGFRCR SERPLBLD CKD-EPI 2021: >90 ML/MIN/1.73M2
ERYTHROCYTE [DISTWIDTH] IN BLOOD BY AUTOMATED COUNT: 12.3 % (ref 10–15)
FASTING STATUS PATIENT QL REPORTED: YES
GLUCOSE SERPL-MCNC: 89 MG/DL (ref 70–99)
HCT VFR BLD AUTO: 45.5 % (ref 35–47)
HDLC SERPL-MCNC: 70 MG/DL
HGB BLD-MCNC: 15.3 G/DL (ref 11.7–15.7)
LDLC SERPL CALC-MCNC: 79 MG/DL
MCH RBC QN AUTO: 29.9 PG (ref 26.5–33)
MCHC RBC AUTO-ENTMCNC: 33.6 G/DL (ref 31.5–36.5)
MCV RBC AUTO: 89 FL (ref 78–100)
NONHDLC SERPL-MCNC: 89 MG/DL
PLATELET # BLD AUTO: 242 10E3/UL (ref 150–450)
POTASSIUM SERPL-SCNC: 4 MMOL/L (ref 3.4–5.3)
PROT SERPL-MCNC: 6.7 G/DL (ref 6.4–8.3)
RBC # BLD AUTO: 5.12 10E6/UL (ref 3.8–5.2)
SODIUM SERPL-SCNC: 138 MMOL/L (ref 135–145)
TRIGL SERPL-MCNC: 48 MG/DL
TSH SERPL DL<=0.005 MIU/L-ACNC: 2.9 UIU/ML (ref 0.3–4.2)
WBC # BLD AUTO: 5 10E3/UL (ref 4–11)

## 2024-02-05 PROCEDURE — 96127 BRIEF EMOTIONAL/BEHAV ASSMT: CPT

## 2024-02-05 PROCEDURE — 80053 COMPREHEN METABOLIC PANEL: CPT

## 2024-02-05 PROCEDURE — 80061 LIPID PANEL: CPT

## 2024-02-05 PROCEDURE — 99214 OFFICE O/P EST MOD 30 MIN: CPT | Mod: 25

## 2024-02-05 PROCEDURE — 85027 COMPLETE CBC AUTOMATED: CPT

## 2024-02-05 PROCEDURE — 36415 COLL VENOUS BLD VENIPUNCTURE: CPT

## 2024-02-05 PROCEDURE — 84443 ASSAY THYROID STIM HORMONE: CPT

## 2024-02-05 PROCEDURE — 99395 PREV VISIT EST AGE 18-39: CPT

## 2024-02-05 RX ORDER — ATOMOXETINE 40 MG/1
40 CAPSULE ORAL DAILY
COMMUNITY

## 2024-02-05 RX ORDER — ESCITALOPRAM OXALATE 5 MG/1
1 TABLET ORAL
COMMUNITY
Start: 2023-05-06 | End: 2024-02-05

## 2024-02-05 RX ORDER — ESCITALOPRAM OXALATE 10 MG/1
10 TABLET ORAL DAILY
Qty: 90 TABLET | Refills: 1 | Status: SHIPPED | OUTPATIENT
Start: 2024-02-05

## 2024-02-05 ASSESSMENT — ENCOUNTER SYMPTOMS
FREQUENCY: 0
SHORTNESS OF BREATH: 0
JOINT SWELLING: 0
WEAKNESS: 0
HEADACHES: 0
MYALGIAS: 0
CHILLS: 0
COUGH: 0
ABDOMINAL PAIN: 0
PALPITATIONS: 0
NERVOUS/ANXIOUS: 0
ARTHRALGIAS: 0
HEMATURIA: 0
CONSTIPATION: 0
DIARRHEA: 0
DIZZINESS: 0
NAUSEA: 0
SORE THROAT: 0
DYSURIA: 0
FEVER: 0
EYE PAIN: 0

## 2024-02-05 ASSESSMENT — PATIENT HEALTH QUESTIONNAIRE - PHQ9
10. IF YOU CHECKED OFF ANY PROBLEMS, HOW DIFFICULT HAVE THESE PROBLEMS MADE IT FOR YOU TO DO YOUR WORK, TAKE CARE OF THINGS AT HOME, OR GET ALONG WITH OTHER PEOPLE: NOT DIFFICULT AT ALL
SUM OF ALL RESPONSES TO PHQ QUESTIONS 1-9: 1
SUM OF ALL RESPONSES TO PHQ QUESTIONS 1-9: 1

## 2024-02-05 NOTE — PROGRESS NOTES
Preventive Care Visit  [facility]  GABBY Contreras CNP, Nurse Practitioner Primary Care  Feb 5, 2024    Assessment & Plan     Routine history and physical examination of adult  Preventative exam completed today. Discussed healthy lifestyle recommendations of getting 150 minutes of exercise weekly and eating a healthy diet. Reviewed and updated health maintenance. Labs completed today.   - CBC with platelets  - Comprehensive metabolic panel (BMP + Alb, Alk Phos, ALT, AST, Total. Bili, TP)  - Lipid panel reflex to direct LDL Fasting  - TSH with free T4 reflex    Generalized anxiety disorder  Stable. Continue current dose of lexapro. She has been looking for a therapist. Therapy referral placed today. We did discuss can consider tapering down in the spring if she desires.   - escitalopram (LEXAPRO) 10 MG tablet  Dispense: 90 tablet; Refill: 1  - Adult [facility] Referral  - TSH with free T4 reflex    Mild recurrent major depression (H24)  Stable. Continue current dose of lexapro. Will have her establish with therapist prior to tapering off is she desires.   - escitalopram (LEXAPRO) 10 MG tablet  Dispense: 90 tablet; Refill: 1  - [facility] Referral  - TSH with free T4 reflex    Attention deficit hyperactivity disorder (ADHD), predominantly inattentive type  Stable. Continue Strattera, however discussed she may discontinue when she feels ready or feels she no longer needs it.     Perioral dermatitis  Trial metronidazole for perioral/perinasal irritation/acne.   - metroNIDAZOLE (METROCREAM) 0.75 % external cream  Dispense: 45 g; Refill: 0      Counseling  Appropriate preventive services were discussed with this patient, including applicable screening as appropriate for fall prevention, nutrition, physical activity, Tobacco-use cessation, weight loss and cognition.  Checklist reviewing preventive services available has been given to the patient.  Reviewed patient's  diet, addressing concerns and/or questions.     Plan to follow up in 6 months.     Subjective   Malini is a 29 year old, presenting for the following:  Establish Care and Physical        2/5/2024     8:21 AM   Additional Questions   Roomed by Lola NOLAN CMA        Via the Health Maintenance questionnaire, the patient has reported the following services have been completed -Cervical Cancer Screening, this information has been sent to the abstraction team.  Health Care Directive  Patient does not have a Health Care Directive or Living Will: Discussed advance care planning with patient; information given to patient to review.    Healthy Habits:     Getting at least 3 servings of Calcium per day:  Yes    Bi-annual eye exam:  Yes    Dental care twice a year:  Yes    Sleep apnea or symptoms of sleep apnea:  None    Diet:  Low salt    Frequency of exercise:  2-3 days/week    Taking medications regularly:  Yes    Medication side effects:  Not applicable    Additional concerns today:  No    Lived in [place], [place]. Now back in [place] for work.    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 Has been on sertraline off/on since 16, now on lexapro. Wondering if she needs it/should taper down now that she is out of school.     On Strattera for ADHD. Did not tolerate stimulants. Thinking about stopping this since she is out of school.     Been on spironolactone for jaw line acne but does not think she needs it anymore. Will have perioral/perinasal skin irritation.     Last pap April 2, 23.          1/29/2024   Social Factors   Worry food won't last until get money to buy more No   Food not last or not have enough money for food? No   Do you have housing?  Yes   Are you worried about losing your housing? No   Lack of transportation? No   Unable to get utilities (heat,electricity)? No     Today's PHQ-9 Score:       2/5/2024      8:16 AM   PHQ-9 SCORE   PHQ-9 Total Score MyChart 1 (Minimal depression)   PHQ-9 Total Score 1         1/29/2024   Substance Use   Alcohol more than 3/day or more than 7/wk No     Social History     Tobacco Use    Smoking status: Never    Smokeless tobacco: Never   Substance Use Topics    Alcohol use: Yes     Comment: Alcoholic Drinks/day: occas    Drug use: No           1/29/2024   LAST FHS-7 RESULTS   1st degree relative breast or ovarian cancer No   Any relative bilateral breast cancer No   Any male have breast cancer No   Any woman have breast and ovarian cancer No   Any woman with breast cancer before 50yrs No   2 or more relatives with breast and/or ovarian cancer No   2 or more relatives with breast and/or bowel cancer Yes     Patient under 40 years of age: Routine Mammogram Screening not recommended.     History of abnormal Pap smear: NO - age 21-29 PAP every 3 years recommended         Reviewed and updated as needed this visit by Provider                  Labs reviewed in EPIC  BP Readings from Last 3 Encounters:   02/05/24 100/62   11/20/19 126/85   10/23/19 129/77    Wt Readings from Last 3 Encounters:   02/05/24 52.5 kg (115 lb 12.8 oz)   11/20/19 53.1 kg (117 lb)   10/23/19 54 kg (119 lb)                    Review of Systems   Constitutional:  Negative for chills and fever.   HENT:  Negative for congestion, ear pain, hearing loss and sore throat.    Eyes:  Negative for pain and visual disturbance.   Respiratory:  Negative for cough and shortness of breath.    Cardiovascular:  Negative for chest pain and palpitations.   Gastrointestinal:  Negative for abdominal pain, constipation, diarrhea and nausea.   Genitourinary:  Negative for dysuria, frequency, genital sores, hematuria, pelvic pain, urgency, vaginal bleeding and vaginal discharge.   Musculoskeletal:  Negative for arthralgias, joint swelling and myalgias.   Skin:  Negative for rash.   Neurological:  Negative for dizziness, weakness and headaches.  "  Psychiatric/Behavioral:  The patient is not nervous/anxious.           Objective    Exam  /62 (BP Location: Left arm, Patient Position: Sitting, Cuff Size: Adult Regular)   Pulse 80   Temp 97.8  F (36.6  C) (Tympanic)   Resp 16   Ht 1.61 m (5' 3.39\")   Wt 52.5 kg (115 lb 12.8 oz)   LMP  (LMP Unknown)   SpO2 100%   Breastfeeding No   BMI 20.26 kg/m     Estimated body mass index is 20.26 kg/m  as calculated from the following:    Height as of this encounter: 1.61 m (5' 3.39\").    Weight as of this encounter: 52.5 kg (115 lb 12.8 oz).    Physical Exam  GENERAL: alert and no distress  EYES: Eyes grossly normal to inspection, PERRL and conjunctivae and sclerae normal  HENT: ear canals and TM's normal, nose and mouth without ulcers or lesions  NECK: no adenopathy, no asymmetry, masses, or scars  RESP: lungs clear to auscultation - no rales, rhonchi or wheezes  CV: regular rate and rhythm, normal S1 S2, no S3 or S4, no murmur, click or rub, no peripheral edema  ABDOMEN: soft, nontender, no hepatosplenomegaly, no masses and bowel sounds normal  MS: no gross musculoskeletal defects noted, no edema  SKIN: no suspicious lesions or rashes  NEURO: Normal strength and tone, mentation intact and speech normal  PSYCH: mentation appears normal, affect normal/bright    Signed Electronically by: GABBY Contreras CNP      "

## 2024-02-28 ENCOUNTER — MYC MEDICAL ADVICE (OUTPATIENT)
Dept: FAMILY MEDICINE | Facility: CLINIC | Age: 29
End: 2024-02-28
Payer: COMMERCIAL

## 2024-02-28 DIAGNOSIS — L71.0 PERIORAL DERMATITIS: ICD-10-CM

## 2024-03-11 ENCOUNTER — TELEPHONE (OUTPATIENT)
Dept: FAMILY MEDICINE | Facility: CLINIC | Age: 29
End: 2024-03-11

## 2024-03-11 NOTE — TELEPHONE ENCOUNTER
Symptoms    Describe your symptoms: cough,body aches  Covid home tests have been negative, she has done 3 of them    Any pain: No    How long have you been having symptoms: 1  days    Have you been seen for this:  No    Appointment offered?: No    Triage offered?: No    Home remedies tried: n/a    Preferred Pharmacy:   [id number] - [place], [place] - [address] [place] [address]  Phone: [phone] Fax: [phone]        Could we send this information to you in [employer] or would you prefer to receive a phone call?:   Patient would prefer a phone call   Okay to leave a detailed message?: Yes at Cell number on file:    Telephone Information:   Mobile [phone]   Mobile [phone]

## 2024-03-13 ENCOUNTER — VIRTUAL VISIT (OUTPATIENT)
Dept: INTERNAL MEDICINE | Facility: CLINIC | Age: 29
End: 2024-03-13
Payer: COMMERCIAL

## 2024-03-13 DIAGNOSIS — J98.8 VIRAL RESPIRATORY INFECTION: Primary | ICD-10-CM

## 2024-03-13 DIAGNOSIS — B97.89 VIRAL RESPIRATORY INFECTION: Primary | ICD-10-CM

## 2024-03-13 PROCEDURE — 99215 OFFICE O/P EST HI 40 MIN: CPT | Mod: 95 | Performed by: INTERNAL MEDICINE

## 2024-03-13 RX ORDER — PREDNISONE 10 MG/1
10 TABLET ORAL EVERY MORNING
Qty: 4 TABLET | Refills: 0 | Status: SHIPPED | OUTPATIENT
Start: 2024-03-13 | End: 2024-03-17

## 2024-03-13 RX ORDER — LIDOCAINE HYDROCHLORIDE 20 MG/ML
15 SOLUTION OROPHARYNGEAL
Qty: 100 ML | Refills: 3 | Status: SHIPPED | OUTPATIENT
Start: 2024-03-13

## 2024-03-13 RX ORDER — GUAIFENESIN 600 MG/1
1200 TABLET, EXTENDED RELEASE ORAL 2 TIMES DAILY
COMMUNITY
Start: 2024-03-13

## 2024-03-13 RX ORDER — CODEINE PHOSPHATE AND GUAIFENESIN 10; 100 MG/5ML; MG/5ML
1-2 SOLUTION ORAL EVERY 6 HOURS PRN
Qty: 240 ML | Refills: 0 | Status: SHIPPED | OUTPATIENT
Start: 2024-03-13 | End: 2024-03-13

## 2024-03-13 RX ORDER — BENZONATATE 200 MG/1
200 CAPSULE ORAL 3 TIMES DAILY PRN
Qty: 20 CAPSULE | Refills: 1 | Status: SHIPPED | OUTPATIENT
Start: 2024-03-13

## 2024-03-13 ASSESSMENT — ENCOUNTER SYMPTOMS: COUGH: 1

## 2024-03-13 NOTE — PATIENT INSTRUCTIONS
Viscous lidocaine as needed    Prednisone 10 mg daily for 4 days    Robitussin with codeine as needed    Continue Mucinex 1200 mg twice daily    Benzonatate 200 mg every 8 hours as needed    Viral swab    Check on cost of viral swab    MyChart information and communication initiated

## 2024-03-13 NOTE — PROGRESS NOTES
Malini is a 29 year old who is being evaluated via a billable video visit.    How would you like to obtain your AVS? MyChart  If the video visit is dropped, the invitation should be resent by: Send to e-mail at: [email or web address]  Will anyone else be joining your video visit? No  {If patient encounters technical issues they should call [phone] :[id number]}    {PROVIDER CHARTING PREFERENCE:349017}    Subjective   Malini is a 29 year old, presenting for the following health issues:  Cough (URI Sx's (cough, fever, fatigue,etc) )      3/13/2024     9:54 AM   Additional Questions   Roomed by AMY Lazcano   Accompanied by Alone     Cough    History of Present Illness       Reason for visit:  Cough, fever, flu test  Symptom onset:  1-3 days ago  Symptoms include:  Cough, fever, body aches, chills, sore throat, congestion, runny nose  Symptom intensity:  Severe  Symptom progression:  Worsening  Had these symptoms before:  No  What makes it better:  Tylenol, NSAIDs    She eats 4 or more servings of fruits and vegetables daily.She consumes 0 sweetened beverage(s) daily.She exercises with enough effort to increase her heart rate 20 to 29 minutes per day.  She exercises with enough effort to increase her heart rate 3 or less days per week. She is missing 1 dose(s) of medications per week.  She is not taking prescribed medications regularly due to remembering to take.       {SUPERLIST (Optional):415915}  {additonal problems for provider to add (Optional):202886}    {ROS Picklists (Optional):525564}      Objective           Vitals:  No vitals were obtained today due to virtual visit.    Physical Exam   {video visit exam brief selected:799592}    {Diagnostic Test Results (Optional):494972}      Video-Visit Details    Type of service:  Video Visit   Originating Location (pt. Location): Home  {PROVIDER LOCATION On-site should be selected for visits conducted from your clinic location or adjoining [facility] hospital, academic office, or  other nearby [facility] building. Off-site should be selected for all other provider locations, including home:293049}  Distant Location (provider location):  Off-site  Platform used for Video Visit: Tiffany  Signed Electronically by: Blaine Moya MD  {Email feedback regarding this note to [email or web address]   :[id number]}

## 2024-03-13 NOTE — PROGRESS NOTES
Malini is a 29 year old female contacting the clinic today via video, who will use the platform: Tapgage for the visit.  Phone # for [employer], or if [facility] drops:   Telephone Information:   Mobile [phone]   Mobile [phone]          ASSESSMENT and PLAN:  1. Viral respiratory infection  Attempt to swab but discussed treatment options.  Treat symptomatically.  Argue against Paxlovid.  Consider Tamiflu  - predniSONE (DELTASONE) 10 MG tablet; Take 1 tablet (10 mg) by mouth every morning for 4 days  Dispense: 4 tablet; Refill: 0  - guaiFENesin (MUCINEX) 600 MG 12 hr tablet; Take 2 tablets (1,200 mg) by mouth 2 times daily  - lidocaine, viscous, (XYLOCAINE) 2 % solution; Swish and swallow 15 mLs in mouth every 3 hours as needed for moderate pain Max 8 doses/24 hour period.  Dispense: 100 mL; Refill: 3  - benzonatate (TESSALON) 200 MG capsule; Take 1 capsule (200 mg) by mouth 3 times daily as needed for cough  Dispense: 20 capsule; Refill: 1  - Symptomatic Influenza A/B, RSV, & SARS-CoV2 PCR (COVID-19); Future       Patient Instructions   Viscous lidocaine as needed    Prednisone 10 mg daily for 4 days    Robitussin with codeine as needed    Continue Mucinex 1200 mg twice daily    Benzonatate 200 mg every 8 hours as needed    Viral swab    Check on cost of viral swab    [facility] information and communication initiated            Return if symptoms worsen or fail to improve, for using a video visit.       CHIEF COMPLAINT:  Chief Complaint   Patient presents with    Cough     URI Sx's (cough, fever, fatigue,etc)        HISTORY OF PRESENT ILLNESS:  Malini is a 29 year old female contacting the clinic today via video for complaints of viral syndrome.  Became ill first day March 11.  Currently complains of cough, fever, sinus congestion, rhinorrhea, and myalgias.  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  Has taken off work.  Wishes to be tested.   "Discussed viral inflammation, viral damage, potential progression to bacterial sinus infection, and medicines for treatment.  Has never taken steroids.  Severe sore throat but no exposure to strep      History of Present Illness       Reason for visit:  Cough, fever, flu test  Symptom onset:  1-3 days ago  Symptoms include:  Cough, fever, body aches, chills, sore throat, congestion, runny nose  Symptom intensity:  Severe  Symptom progression:  Worsening  Had these symptoms before:  No  What makes it better:  Tylenol, NSAIDs    She eats 4 or more servings of fruits and vegetables daily.She consumes 0 sweetened beverage(s) daily.She exercises with enough effort to increase her heart rate 20 to 29 minutes per day.  She exercises with enough effort to increase her heart rate 3 or less days per week. She is missing 1 dose(s) of medications per week.  She is not taking prescribed medications regularly due to remembering to take.      REVIEW OF SYSTEMS:   No asthma    Today's PHQ-2 Score:        No data to display                PFSH:  Social History     Social History Narrative    Not on file     Works as a physician assistant    TOBACCO USE:  History   Smoking Status    Never   Smokeless Tobacco    Never       VITALS:  There were no vitals filed for this visit.  LMP  (LMP Unknown)  Estimated body mass index is 20.26 kg/m  as calculated from the following:    Height as of 2/5/24: 1.61 m (5' 3.39\").    Weight as of 2/5/24: 52.5 kg (115 lb 12.8 oz).    PHYSICAL EXAM:  (observations via Video)  Alert and oriented.  Frequently blowing nose and coughing.  Watery conjunctivitis    MEDICATIONS:   Current Outpatient Medications   Medication Sig Dispense Refill    atomoxetine (STRATTERA) 40 MG capsule Take 40 mg by mouth daily      benzonatate (TESSALON) 200 MG capsule Take 1 capsule (200 mg) by mouth 3 times daily as needed for cough 20 capsule 1    escitalopram (LEXAPRO) 10 MG tablet Take 1 tablet (10 mg) by mouth daily 90 " tablet 1    guaiFENesin (MUCINEX) 600 MG 12 hr tablet Take 2 tablets (1,200 mg) by mouth 2 times daily      lidocaine, viscous, (XYLOCAINE) 2 % solution Swish and swallow 15 mLs in mouth every 3 hours as needed for moderate pain Max 8 doses/24 hour period. 100 mL 3    metroNIDAZOLE (METROCREAM) 0.75 % external cream Apply topically 2 times daily 45 g 0    predniSONE (DELTASONE) 10 MG tablet Take 1 tablet (10 mg) by mouth every morning for 4 days 4 tablet 0    guaiFENesin-codeine (ROBITUSSIN AC) 100-10 MG/5ML solution Take 5-10 mLs by mouth every 6 hours as needed for cough 240 mL 0       Outside Notes summarized:   Labs, x-rays, cardiology, GI tests reviewed:   Recent Labs   Lab Test 02/05/24  0916   HGB 15.3   WBC 5.0      POTASSIUM 4.0   CR 0.68   TSH 2.90     Lab Results   Component Value Date    ZYXFB10MOG Not Detected 08/02/2020     Lab Results   Component Value Date    CHOL 159 02/05/2024     New orders:   Orders Placed This Encounter   Procedures    Symptomatic Influenza A/B, RSV, & SARS-CoV2 PCR (COVID-19)       Independent review of:         3/13/2024     9:54 AM   Additional Questions   Roomed by AMY Lazcano   Accompanied by Alone       Video-Visit Details  Type of service:  Video Visit  Patient has given verbal consent to a Video visit?  Yes  How would you like to obtain your AVS?  MyChart  Will anyone else be joining your video visit, giving supplemental history? No    Originating location (pt location): Home    Distant Location (provider location):  Off-site    Video Start Time: 10:42 AM  Video End time:  11:25 AM  Face to face plus orders: 43 minutes  Documentation time:  3 minutes     The visit lasted a total of 46 minutes    Blaine Moya MD  [facility]

## 2024-03-29 ENCOUNTER — MYC MEDICAL ADVICE (OUTPATIENT)
Dept: FAMILY MEDICINE | Facility: CLINIC | Age: 29
End: 2024-03-29
Payer: COMMERCIAL

## 2024-04-08 ENCOUNTER — MYC MEDICAL ADVICE (OUTPATIENT)
Dept: FAMILY MEDICINE | Facility: CLINIC | Age: 29
End: 2024-04-08
Payer: COMMERCIAL

## 2024-04-08 DIAGNOSIS — L70.0 ACNE VULGARIS: ICD-10-CM

## 2024-04-08 DIAGNOSIS — L70.9 ACNE: Primary | ICD-10-CM

## 2024-04-09 RX ORDER — TRETINOIN 0.5 MG/G
CREAM TOPICAL
Qty: 45 G | Refills: 1 | Status: SHIPPED | OUTPATIENT
Start: 2024-04-09

## 2024-12-16 ENCOUNTER — TRANSFERRED RECORDS (OUTPATIENT)
Dept: HEALTH INFORMATION MANAGEMENT | Facility: CLINIC | Age: 29
End: 2024-12-16
Payer: COMMERCIAL

## 2025-01-06 ENCOUNTER — PATIENT OUTREACH (OUTPATIENT)
Dept: CARE COORDINATION | Facility: CLINIC | Age: 30
End: 2025-01-06
Payer: COMMERCIAL

## 2025-01-20 ENCOUNTER — PATIENT OUTREACH (OUTPATIENT)
Dept: CARE COORDINATION | Facility: CLINIC | Age: 30
End: 2025-01-20
Payer: COMMERCIAL

## 2025-02-17 ENCOUNTER — VIRTUAL VISIT (OUTPATIENT)
Dept: FAMILY MEDICINE | Facility: CLINIC | Age: 30
End: 2025-02-17
Payer: COMMERCIAL

## 2025-02-17 DIAGNOSIS — F41.1 GENERALIZED ANXIETY DISORDER: Primary | ICD-10-CM

## 2025-02-17 DIAGNOSIS — F90.0 ATTENTION DEFICIT HYPERACTIVITY DISORDER (ADHD), PREDOMINANTLY INATTENTIVE TYPE: Chronic | ICD-10-CM

## 2025-02-17 DIAGNOSIS — F33.0 MILD RECURRENT MAJOR DEPRESSION: ICD-10-CM

## 2025-02-17 PROCEDURE — 98006 SYNCH AUDIO-VIDEO EST MOD 30: CPT

## 2025-02-17 RX ORDER — SPIRONOLACTONE 100 MG/1
1 TABLET, FILM COATED ORAL
COMMUNITY
Start: 2024-12-27

## 2025-02-17 RX ORDER — ATOMOXETINE 40 MG/1
40 CAPSULE ORAL DAILY
Qty: 90 CAPSULE | Refills: 1 | Status: SHIPPED | OUTPATIENT
Start: 2025-02-17

## 2025-02-17 RX ORDER — SPIRONOLACTONE 50 MG/1
50 TABLET, FILM COATED ORAL DAILY
COMMUNITY
Start: 2025-02-03

## 2025-02-17 RX ORDER — ESCITALOPRAM OXALATE 10 MG/1
10 TABLET ORAL DAILY
Qty: 90 TABLET | Refills: 1 | Status: SHIPPED | OUTPATIENT
Start: 2025-02-17

## 2025-02-17 RX ORDER — SULFACETAMIDE SODIUM, SULFUR 100; 50 MG/G; MG/G
1 EMULSION TOPICAL DAILY
COMMUNITY
Start: 2025-02-03

## 2025-02-17 RX ORDER — CLINDAMYCIN PHOSPHATE 10 UG/ML
LOTION TOPICAL 2 TIMES DAILY
COMMUNITY
Start: 2025-02-14

## 2025-02-17 ASSESSMENT — PATIENT HEALTH QUESTIONNAIRE - PHQ9: SUM OF ALL RESPONSES TO PHQ QUESTIONS 1-9: 0

## 2025-02-17 NOTE — PROGRESS NOTES
Malini is a 30 year old who is being evaluated via a billable video visit.    How would you like to obtain your AVS? MyChart  If the video visit is dropped, the invitation should be resent by: Send to e-mail at: [email or web address]  Will anyone else be joining your video visit? No      Assessment & Plan     Generalized anxiety disorder  Attempted weaning of her escitalopram and unfortunately had worsening symptoms.  Will continue at current dose of 10 mg.  Consider attempting another wean after some situational stressors have improved and weather is warmer.  - escitalopram (LEXAPRO) 10 MG tablet  Dispense: 90 tablet; Refill: 1    Mild recurrent major depression  PHQ-9 score: 0.  Well-managed with current Lexapro dosage.  - escitalopram (LEXAPRO) 10 MG tablet  Dispense: 90 tablet; Refill: 1    Attention deficit hyperactivity disorder (ADHD), predominantly inattentive type  Well managed on current medication.  Continue Strattera at current dose.  - atomoxetine (STRATTERA) 40 MG capsule  Dispense: 90 capsule; Refill: 1      Plan to follow-up soon for routine physical and Pap.        Subjective   Malini is a 30 year old, presenting for the following health issues:  Medication Refill      2/17/2025     9:17 AM   Additional Questions   Roomed by pat browning     Medication Refill    History of Present Illness       Reason for visit:  Mede refills   She is taking medications regularly.     Following on on anxiety and ADHD.  Tried to go down to 5 of lexapro for a few weeks but anxiety increased so would like to keep current dose of 10mg.     Just moved to [facility] into a new house. So some increased stress    Planning on replacing her IUD soon - is going to go to same obgyn she saw previously.    Due for pap-has appointment set for this next month.     Saw dermatology and topical medications for acne were switched. Also started on spironolactone.  100mg AM and 50mg PM.    Ran out of her Strattera. Had an older  prescription of 60 mg tablets.  These were too much for her, or stomach upset.  Would prefer to stay with 40 mg which was her most recent dose.          4/19/2021     8:17 AM 2/5/2024     8:16 AM 2/17/2025     9:15 AM   PHQ   PHQ-9 Total Score 1 1 0   Q9: Thoughts of better off dead/self-harm past 2 weeks Not at all Not at all Not at all           Review of Systems  Constitutional, HEENT, cardiovascular, pulmonary, gi and gu systems are negative, except as otherwise noted.      Objective    Vitals - Patient Reported  Pain Score: No Pain (0)        Physical Exam   GENERAL: alert and no distress  EYES: Eyes grossly normal to inspection.  No discharge or erythema, or obvious scleral/conjunctival abnormalities.  RESP: No audible wheeze, cough, or visible cyanosis.    SKIN: Visible skin clear. No significant rash, abnormal pigmentation or lesions.  NEURO: Cranial nerves grossly intact.  Mentation and speech appropriate for age.  PSYCH: Appropriate affect, tone, and pace of words          Video-Visit Details    Type of service:  Video Visit   Originating Location (pt. Location): Home  Distant Location (provider location):  On-site  Platform used for Video Visit: Tiffany    Signed Electronically by: GABBY Contreras CNP

## 2025-03-22 ENCOUNTER — HEALTH MAINTENANCE LETTER (OUTPATIENT)
Age: 30
End: 2025-03-22